# Patient Record
Sex: FEMALE | Race: WHITE | HISPANIC OR LATINO | Employment: FULL TIME | URBAN - NONMETROPOLITAN AREA
[De-identification: names, ages, dates, MRNs, and addresses within clinical notes are randomized per-mention and may not be internally consistent; named-entity substitution may affect disease eponyms.]

---

## 2022-10-22 ENCOUNTER — OFFICE VISIT (OUTPATIENT)
Dept: URGENT CARE | Facility: PHYSICIAN GROUP | Age: 22
End: 2022-10-22
Payer: OTHER GOVERNMENT

## 2022-10-22 VITALS
OXYGEN SATURATION: 97 % | DIASTOLIC BLOOD PRESSURE: 62 MMHG | HEART RATE: 67 BPM | HEIGHT: 65 IN | BODY MASS INDEX: 30.16 KG/M2 | TEMPERATURE: 97.2 F | WEIGHT: 181 LBS | SYSTOLIC BLOOD PRESSURE: 96 MMHG | RESPIRATION RATE: 14 BRPM

## 2022-10-22 DIAGNOSIS — H81.12 BENIGN PAROXYSMAL POSITIONAL VERTIGO OF LEFT EAR: ICD-10-CM

## 2022-10-22 PROCEDURE — 99203 OFFICE O/P NEW LOW 30 MIN: CPT | Performed by: FAMILY MEDICINE

## 2022-10-22 RX ORDER — MECLIZINE HYDROCHLORIDE 25 MG/1
TABLET ORAL
Qty: 20 TABLET | Refills: 0 | Status: ON HOLD | OUTPATIENT
Start: 2022-10-22 | End: 2023-11-13

## 2022-10-22 NOTE — PROGRESS NOTES
Chief Complaint:    Chief Complaint   Patient presents with    Dizziness     X3 days denies nausea, vomiting or diarrhea,. Pt is also having loss of balance and L ear pain comes and goes.        History of Present Illness:    Dizziness, description is consistent with vertigo, worse with change in head position for 3 days. No similar prior symptoms. In exam room, mild headache in front of head, otherwise feels normal. No fever, change in vision, nasal symptoms, sore throat, cough, vomiting, or diarrhea.      Past Medical History:    No past medical history on file.    Past Surgical History:    No past surgical history on file.    Social History:    Social History     Socioeconomic History    Marital status: Single     Spouse name: Not on file    Number of children: Not on file    Years of education: Not on file    Highest education level: Not on file   Occupational History    Not on file   Tobacco Use    Smoking status: Never    Smokeless tobacco: Never   Vaping Use    Vaping Use: Never used   Substance and Sexual Activity    Alcohol use: Never    Drug use: Never    Sexual activity: Not on file   Other Topics Concern    Not on file   Social History Narrative    Not on file     Social Determinants of Health     Financial Resource Strain: Not on file   Food Insecurity: Not on file   Transportation Needs: Not on file   Physical Activity: Not on file   Stress: Not on file   Social Connections: Not on file   Intimate Partner Violence: Not on file   Housing Stability: Not on file     Family History:    No family history on file.    Medications:    No current outpatient medications on file prior to visit.     No current facility-administered medications on file prior to visit.     Allergies:    No Known Allergies      Vitals:    Vitals:    10/22/22 1052   BP: (!) 96/62   BP Location: Left arm   Patient Position: Sitting   BP Cuff Size: Adult   Pulse: 67   Resp: 14   Temp: 36.2 °C (97.2 °F)   TempSrc: Temporal   SpO2: 97%  "  Weight: 82.1 kg (181 lb)   Height: 1.638 m (5' 4.5\")       Physical Exam:    Constitutional: Vital signs reviewed. Appears well-developed and well-nourished. No acute distress.   Eyes: Sclera white, conjunctivae clear. PERRLA.  ENT: External ears normal. External auditory canals normal without discharge. TMs translucent and non-bulging. Hearing normal. Lips/teeth are normal. Oral mucosa pink and moist. Posterior pharynx: WNL.  Neck: Neck supple.   Pulmonary/Chest: Respirations non-labored.   Musculoskeletal: Normal gait. Normal range of motion. No muscular atrophy or weakness.  Neurological: Alert and oriented to person, place, and time. CN 2-12 intact. Muscle tone normal. Coordination normal. Normal cerebellar exam. Positive Butte-Hallpike maneuver to left, negative to right.  Skin: No rashes or lesions. Warm, dry, normal turgor.  Psychiatric: Normal mood and affect. Behavior is normal. Judgment and thought content normal.       Medical Decision Makin. Benign paroxysmal positional vertigo of left ear  - meclizine (ANTIVERT) 25 MG Tab; 0.5 TO 1 TAB BY MOUTH EVERY 6 HOURS ONLY IF NEEDED FOR DIZZINESS, NAUSEA, OR VOMITING. MAY CAUSE DROWSINESS.  Dispense: 20 Tablet; Refill: 0       Discussed with her DDX, management options, and risks, benefits, and alternatives to treatment plan agreed upon.    Dizziness, description is consistent with vertigo, worse with change in head position for 3 days. No similar prior symptoms. In exam room, mild headache in front of head, otherwise feels normal. No fever, change in vision, nasal symptoms, sore throat, cough, vomiting, or diarrhea.    Positive Butte-Hallpike maneuver to left, negative to right.    Benign Positional Vertigo discussed.    Agreeable to medication prescribed for symptomatic treatment.    Encouraged watching YouTube video of ENT Shandra Cook MD Half Andrae Chi explaining this Canalith Repositioning Maneuver which she may do which may help symptoms " resolve faster than time alone.     Discussed expected course of duration, time for improvement, and to seek follow-up in Emergency Room, urgent care, or with PCP if getting worse at any time or not improving within expected time frame.

## 2023-06-08 ENCOUNTER — APPOINTMENT (RX ONLY)
Dept: URBAN - METROPOLITAN AREA CLINIC 22 | Facility: CLINIC | Age: 23
Setting detail: DERMATOLOGY
End: 2023-06-08

## 2023-06-08 DIAGNOSIS — L70.0 ACNE VULGARIS: ICD-10-CM | Status: INADEQUATELY CONTROLLED

## 2023-06-08 DIAGNOSIS — Z71.89 OTHER SPECIFIED COUNSELING: ICD-10-CM

## 2023-06-08 DIAGNOSIS — L259 CONTACT DERMATITIS AND OTHER ECZEMA, UNSPECIFIED CAUSE: ICD-10-CM | Status: INADEQUATELY CONTROLLED

## 2023-06-08 DIAGNOSIS — L81.0 POSTINFLAMMATORY HYPERPIGMENTATION: ICD-10-CM

## 2023-06-08 PROBLEM — L30.8 OTHER SPECIFIED DERMATITIS: Status: ACTIVE | Noted: 2023-06-08

## 2023-06-08 PROCEDURE — ? COUNSELING

## 2023-06-08 PROCEDURE — ? PRESCRIPTION

## 2023-06-08 PROCEDURE — ? TREATMENT REGIMEN

## 2023-06-08 PROCEDURE — ? SUNSCREEN RECOMMENDATIONS

## 2023-06-08 PROCEDURE — 99204 OFFICE O/P NEW MOD 45 MIN: CPT

## 2023-06-08 RX ORDER — CLINDAMYCIN PHOSPHATE 10 MG/ML
SOLUTION TOPICAL QD
Qty: 60 | Refills: 5 | Status: ERX | COMMUNITY
Start: 2023-06-08

## 2023-06-08 RX ORDER — ADAPALENE AND BENZOYL PEROXIDE 1; 25 MG/G; MG/G
GEL TOPICAL QHS
Qty: 45 | Refills: 5 | Status: ERX | COMMUNITY
Start: 2023-06-08

## 2023-06-08 RX ORDER — TRIAMCINOLONE ACETONIDE 1 MG/G
CREAM TOPICAL BID
Qty: 30 | Refills: 1 | Status: ERX | COMMUNITY
Start: 2023-06-08

## 2023-06-08 RX ADMIN — TRIAMCINOLONE ACETONIDE: 1 CREAM TOPICAL at 00:00

## 2023-06-08 RX ADMIN — CLINDAMYCIN PHOSPHATE: 10 SOLUTION TOPICAL at 00:00

## 2023-06-08 RX ADMIN — ADAPALENE AND BENZOYL PEROXIDE: 1; 25 GEL TOPICAL at 00:00

## 2023-06-08 ASSESSMENT — LOCATION SIMPLE DESCRIPTION DERM
LOCATION SIMPLE: INFERIOR FOREHEAD
LOCATION SIMPLE: CHEST
LOCATION SIMPLE: RIGHT CHEEK
LOCATION SIMPLE: LEFT POSTERIOR UPPER ARM
LOCATION SIMPLE: LEFT SHOULDER
LOCATION SIMPLE: RIGHT POSTERIOR UPPER ARM
LOCATION SIMPLE: LEFT CHEEK

## 2023-06-08 ASSESSMENT — LOCATION ZONE DERM
LOCATION ZONE: TRUNK
LOCATION ZONE: FACE
LOCATION ZONE: ARM

## 2023-06-08 ASSESSMENT — LOCATION DETAILED DESCRIPTION DERM
LOCATION DETAILED: LEFT ANTERIOR SHOULDER
LOCATION DETAILED: INFERIOR MID FOREHEAD
LOCATION DETAILED: RIGHT INFERIOR CENTRAL MALAR CHEEK
LOCATION DETAILED: RIGHT DISTAL POSTERIOR UPPER ARM
LOCATION DETAILED: RIGHT LATERAL SUPERIOR CHEST
LOCATION DETAILED: LEFT DISTAL MEDIAL POSTERIOR UPPER ARM
LOCATION DETAILED: LEFT INFERIOR CENTRAL MALAR CHEEK

## 2023-06-08 NOTE — PROCEDURE: COUNSELING
Detail Level: Generalized
Detail Level: Zone
Topical Clindamycin Counseling: Patient counseled that this medication may cause skin irritation or allergic reactions.  In the event of skin irritation, the patient was advised to reduce the amount of the drug applied or use it less frequently.   The patient verbalized understanding of the proper use and possible adverse effects of clindamycin.  All of the patient's questions and concerns were addressed.
Erythromycin Pregnancy And Lactation Text: This medication is Pregnancy Category B and is considered safe during pregnancy. It is also excreted in breast milk.
Birth Control Pills Counseling: Birth Control Pill Counseling: I discussed with the patient the potential side effects of OCPs including but not limited to increased risk of stroke, heart attack, thrombophlebitis, deep venous thrombosis, hepatic adenomas, breast changes, GI upset, headaches, and depression.  The patient verbalized understanding of the proper use and possible adverse effects of OCPs. All of the patient's questions and concerns were addressed.
Azelaic Acid Pregnancy And Lactation Text: This medication is considered safe during pregnancy and breast feeding.
Spironolactone Counseling: Patient advised regarding risks of diarrhea, abdominal pain, hyperkalemia, birth defects (for female patients), liver toxicity and renal toxicity. The patient may need blood work to monitor liver and kidney function and potassium levels while on therapy. The patient verbalized understanding of the proper use and possible adverse effects of spironolactone.  All of the patient's questions and concerns were addressed.
Sarecycline Counseling: Patient advised regarding possible photosensitivity and discoloration of the teeth, skin, lips, tongue and gums.  Patient instructed to avoid sunlight, if possible.  When exposed to sunlight, patients should wear protective clothing, sunglasses, and sunscreen.  The patient was instructed to call the office immediately if the following severe adverse effects occur:  hearing changes, easy bruising/bleeding, severe headache, or vision changes.  The patient verbalized understanding of the proper use and possible adverse effects of sarecycline.  All of the patient's questions and concerns were addressed.
Isotretinoin Pregnancy And Lactation Text: This medication is Pregnancy Category X and is considered extremely dangerous during pregnancy. It is unknown if it is excreted in breast milk.
Aklief Pregnancy And Lactation Text: It is unknown if this medication is safe to use during pregnancy.  It is unknown if this medication is excreted in breast milk.  Breastfeeding women should use the topical cream on the smallest area of the skin for the shortest time needed while breastfeeding.  Do not apply to nipple and areola.
Dapsone Counseling: I discussed with the patient the risks of dapsone including but not limited to hemolytic anemia, agranulocytosis, rashes, methemoglobinemia, kidney failure, peripheral neuropathy, headaches, GI upset, and liver toxicity.  Patients who start dapsone require monitoring including baseline LFTs and weekly CBCs for the first month, then every month thereafter.  The patient verbalized understanding of the proper use and possible adverse effects of dapsone.  All of the patient's questions and concerns were addressed.
Tetracycline Pregnancy And Lactation Text: This medication is Pregnancy Category D and not consider safe during pregnancy. It is also excreted in breast milk.
Topical Retinoid counseling:  Patient advised to apply a pea-sized amount only at bedtime and wait 30 minutes after washing their face before applying.  If too drying, patient may add a non-comedogenic moisturizer. The patient verbalized understanding of the proper use and possible adverse effects of retinoids.  All of the patient's questions and concerns were addressed.
Topical Sulfur Applications Pregnancy And Lactation Text: This medication is Pregnancy Category C and has an unknown safety profile during pregnancy. It is unknown if this topical medication is excreted in breast milk.
Azithromycin Counseling:  I discussed with the patient the risks of azithromycin including but not limited to GI upset, allergic reaction, drug rash, diarrhea, and yeast infections.
Doxycycline Pregnancy And Lactation Text: This medication is Pregnancy Category D and not consider safe during pregnancy. It is also excreted in breast milk but is considered safe for shorter treatment courses.
Bactrim Counseling:  I discussed with the patient the risks of sulfa antibiotics including but not limited to GI upset, allergic reaction, drug rash, diarrhea, dizziness, photosensitivity, and yeast infections.  Rarely, more serious reactions can occur including but not limited to aplastic anemia, agranulocytosis, methemoglobinemia, blood dyscrasias, liver or kidney failure, lung infiltrates or desquamative/blistering drug rashes.
Minocycline Counseling: Patient advised regarding possible photosensitivity and discoloration of the teeth, skin, lips, tongue and gums.  Patient instructed to avoid sunlight, if possible.  When exposed to sunlight, patients should wear protective clothing, sunglasses, and sunscreen.  The patient was instructed to call the office immediately if the following severe adverse effects occur:  hearing changes, easy bruising/bleeding, severe headache, or vision changes.  The patient verbalized understanding of the proper use and possible adverse effects of minocycline.  All of the patient's questions and concerns were addressed.
Winlevi Pregnancy And Lactation Text: This medication is considered safe during pregnancy and breastfeeding.
Tazorac Counseling:  Patient advised that medication is irritating and drying.  Patient may need to apply sparingly and wash off after an hour before eventually leaving it on overnight.  The patient verbalized understanding of the proper use and possible adverse effects of tazorac.  All of the patient's questions and concerns were addressed.
Use Enhanced Medication Counseling?: No
Birth Control Pills Pregnancy And Lactation Text: This medication should be avoided if pregnant and for the first 30 days post-partum.
Azelaic Acid Counseling: Patient counseled that medicine may cause skin irritation and to avoid applying near the eyes.  In the event of skin irritation, the patient was advised to reduce the amount of the drug applied or use it less frequently.   The patient verbalized understanding of the proper use and possible adverse effects of azelaic acid.  All of the patient's questions and concerns were addressed.
Tazorac Pregnancy And Lactation Text: This medication is not safe during pregnancy. It is unknown if this medication is excreted in breast milk.
Isotretinoin Counseling: Patient should get monthly blood tests, not donate blood, not drive at night if vision affected, not share medication, and not undergo elective surgery for 6 months after tx completed. Side effects reviewed, pt to contact office should one occur.
Dapsone Pregnancy And Lactation Text: This medication is Pregnancy Category C and is not considered safe during pregnancy or breast feeding.
Benzoyl Peroxide Counseling: Patient counseled that medicine may cause skin irritation and bleach clothing.  In the event of skin irritation, the patient was advised to reduce the amount of the drug applied or use it less frequently.   The patient verbalized understanding of the proper use and possible adverse effects of benzoyl peroxide.  All of the patient's questions and concerns were addressed.
High Dose Vitamin A Counseling: Side effects reviewed, pt to contact office should one occur.
Spironolactone Pregnancy And Lactation Text: This medication can cause feminization of the male fetus and should be avoided during pregnancy. The active metabolite is also found in breast milk.
Topical Clindamycin Pregnancy And Lactation Text: This medication is Pregnancy Category B and is considered safe during pregnancy. It is unknown if it is excreted in breast milk.
Azithromycin Pregnancy And Lactation Text: This medication is considered safe during pregnancy and is also secreted in breast milk.
Doxycycline Counseling:  Patient counseled regarding possible photosensitivity and increased risk for sunburn.  Patient instructed to avoid sunlight, if possible.  When exposed to sunlight, patients should wear protective clothing, sunglasses, and sunscreen.  The patient was instructed to call the office immediately if the following severe adverse effects occur:  hearing changes, easy bruising/bleeding, severe headache, or vision changes.  The patient verbalized understanding of the proper use and possible adverse effects of doxycycline.  All of the patient's questions and concerns were addressed.
High Dose Vitamin A Pregnancy And Lactation Text: High dose vitamin A therapy is contraindicated during pregnancy and breast feeding.
Tetracycline Counseling: Patient counseled regarding possible photosensitivity and increased risk for sunburn.  Patient instructed to avoid sunlight, if possible.  When exposed to sunlight, patients should wear protective clothing, sunglasses, and sunscreen.  The patient was instructed to call the office immediately if the following severe adverse effects occur:  hearing changes, easy bruising/bleeding, severe headache, or vision changes.  The patient verbalized understanding of the proper use and possible adverse effects of tetracycline.  All of the patient's questions and concerns were addressed. Patient understands to avoid pregnancy while on therapy due to potential birth defects.
Benzoyl Peroxide Pregnancy And Lactation Text: This medication is Pregnancy Category C. It is unknown if benzoyl peroxide is excreted in breast milk.
Topical Sulfur Applications Counseling: Topical Sulfur Counseling: Patient counseled that this medication may cause skin irritation or allergic reactions.  In the event of skin irritation, the patient was advised to reduce the amount of the drug applied or use it less frequently.   The patient verbalized understanding of the proper use and possible adverse effects of topical sulfur application.  All of the patient's questions and concerns were addressed.
Winlevi Counseling:  I discussed with the patient the risks of topical clascoterone including but not limited to erythema, scaling, itching, and stinging. Patient voiced their understanding.
Bactrim Pregnancy And Lactation Text: This medication is Pregnancy Category D and is known to cause fetal risk.  It is also excreted in breast milk.
Aklief counseling:  Patient advised to apply a pea-sized amount only at bedtime and wait 30 minutes after washing their face before applying.  If too drying, patient may add a non-comedogenic moisturizer.  The most commonly reported side effects including irritation, redness, scaling, dryness, stinging, burning, itching, and increased risk of sunburn.  The patient verbalized understanding of the proper use and possible adverse effects of retinoids.  All of the patient's questions and concerns were addressed.
Topical Retinoid Pregnancy And Lactation Text: This medication is Pregnancy Category C. It is unknown if this medication is excreted in breast milk.
Erythromycin Counseling:  I discussed with the patient the risks of erythromycin including but not limited to GI upset, allergic reaction, drug rash, diarrhea, increase in liver enzymes, and yeast infections.

## 2023-06-08 NOTE — PROCEDURE: TREATMENT REGIMEN
Show Cerave Line: Yes
Action 2: Continue
Detail Level: Generalized
Aaron La Roche-Posay Products: No
Detail Level: Zone

## 2023-06-13 ENCOUNTER — RX ONLY (OUTPATIENT)
Age: 23
Setting detail: RX ONLY
End: 2023-06-13

## 2023-06-13 RX ORDER — ADAPALENE 1 MG/G
GEL TOPICAL
Qty: 45 | Refills: 5 | Status: ERX | COMMUNITY
Start: 2023-06-13

## 2023-11-12 ENCOUNTER — HOSPITAL ENCOUNTER (INPATIENT)
Facility: MEDICAL CENTER | Age: 23
LOS: 18 days | End: 2023-12-01
Attending: OBSTETRICS & GYNECOLOGY | Admitting: OBSTETRICS & GYNECOLOGY
Payer: OTHER GOVERNMENT

## 2023-11-12 DIAGNOSIS — O23.42 URINARY TRACT INFECTION AFFECTING CARE OF MOTHER IN SECOND TRIMESTER, ANTEPARTUM: ICD-10-CM

## 2023-11-12 PROCEDURE — 99284 EMERGENCY DEPT VISIT MOD MDM: CPT

## 2023-11-12 PROCEDURE — 700111 HCHG RX REV CODE 636 W/ 250 OVERRIDE (IP): Mod: JZ | Performed by: OBSTETRICS & GYNECOLOGY

## 2023-11-12 PROCEDURE — 700105 HCHG RX REV CODE 258: Performed by: OBSTETRICS & GYNECOLOGY

## 2023-11-12 RX ORDER — MAGNESIUM SULFATE HEPTAHYDRATE 40 MG/ML
INJECTION, SOLUTION INTRAVENOUS
Status: ACTIVE
Start: 2023-11-12 | End: 2023-11-13

## 2023-11-12 RX ADMIN — MAGNESIUM SULFATE HEPTAHYDRATE 2 G/HR: 40 INJECTION, SOLUTION INTRAVENOUS at 23:45

## 2023-11-12 RX ADMIN — SODIUM CHLORIDE, POTASSIUM CHLORIDE, SODIUM LACTATE AND CALCIUM CHLORIDE: 600; 310; 30; 20 INJECTION, SOLUTION INTRAVENOUS at 23:45

## 2023-11-13 ENCOUNTER — APPOINTMENT (OUTPATIENT)
Dept: RADIOLOGY | Facility: MEDICAL CENTER | Age: 23
End: 2023-11-13
Attending: OBSTETRICS & GYNECOLOGY
Payer: OTHER GOVERNMENT

## 2023-11-13 LAB
ABO + RH BLD: NORMAL
ABO GROUP BLD: NORMAL
APPEARANCE UR: CLEAR
BACTERIA #/AREA URNS HPF: NEGATIVE /HPF
BASOPHILS # BLD AUTO: 0.3 % (ref 0–1.8)
BASOPHILS # BLD: 0.04 K/UL (ref 0–0.12)
BILIRUB UR QL STRIP.AUTO: NEGATIVE
BLD GP AB SCN SERPL QL: NORMAL
C TRACH DNA SPEC QL NAA+PROBE: NEGATIVE
CANDIDA DNA VAG QL PROBE+SIG AMP: NEGATIVE
COLOR UR: YELLOW
EOSINOPHIL # BLD AUTO: 0.02 K/UL (ref 0–0.51)
EOSINOPHIL NFR BLD: 0.1 % (ref 0–6.9)
EPI CELLS #/AREA URNS HPF: ABNORMAL /HPF
ERYTHROCYTE [DISTWIDTH] IN BLOOD BY AUTOMATED COUNT: 42 FL (ref 35.9–50)
G VAGINALIS DNA VAG QL PROBE+SIG AMP: POSITIVE
GLUCOSE UR STRIP.AUTO-MCNC: NEGATIVE MG/DL
HBV SURFACE AG SER QL: ABNORMAL
HCT VFR BLD AUTO: 36.8 % (ref 37–47)
HCV AB SER QL: ABNORMAL
HGB BLD-MCNC: 12.5 G/DL (ref 12–16)
HIV 1+2 AB+HIV1 P24 AG SERPL QL IA: NORMAL
HYALINE CASTS #/AREA URNS LPF: ABNORMAL /LPF
IMM GRANULOCYTES # BLD AUTO: 0.19 K/UL (ref 0–0.11)
IMM GRANULOCYTES NFR BLD AUTO: 1.2 % (ref 0–0.9)
KETONES UR STRIP.AUTO-MCNC: ABNORMAL MG/DL
LEUKOCYTE ESTERASE UR QL STRIP.AUTO: ABNORMAL
LYMPHOCYTES # BLD AUTO: 1.7 K/UL (ref 1–4.8)
LYMPHOCYTES NFR BLD: 10.8 % (ref 22–41)
MAGNESIUM SERPL-MCNC: 4.5 MG/DL (ref 1.5–2.5)
MAGNESIUM SERPL-MCNC: 5.3 MG/DL (ref 1.5–2.5)
MAGNESIUM SERPL-MCNC: 5.3 MG/DL (ref 1.5–2.5)
MCH RBC QN AUTO: 30.7 PG (ref 27–33)
MCHC RBC AUTO-ENTMCNC: 34 G/DL (ref 32.2–35.5)
MCV RBC AUTO: 90.4 FL (ref 81.4–97.8)
MICRO URNS: ABNORMAL
MONOCYTES # BLD AUTO: 0.24 K/UL (ref 0–0.85)
MONOCYTES NFR BLD AUTO: 1.5 % (ref 0–13.4)
N GONORRHOEA DNA SPEC QL NAA+PROBE: NEGATIVE
NEUTROPHILS # BLD AUTO: 13.53 K/UL (ref 1.82–7.42)
NEUTROPHILS NFR BLD: 86.1 % (ref 44–72)
NITRITE UR QL STRIP.AUTO: NEGATIVE
NRBC # BLD AUTO: 0.02 K/UL
NRBC BLD-RTO: 0.1 /100 WBC (ref 0–0.2)
PH UR STRIP.AUTO: 6 [PH] (ref 5–8)
PLATELET # BLD AUTO: 306 K/UL (ref 164–446)
PMV BLD AUTO: 10 FL (ref 9–12.9)
PROT UR QL STRIP: NEGATIVE MG/DL
RBC # BLD AUTO: 4.07 M/UL (ref 4.2–5.4)
RBC # URNS HPF: >150 /HPF
RBC UR QL AUTO: ABNORMAL
RH BLD: NORMAL
RUBV AB SER QL: 188 IU/ML
SP GR UR STRIP.AUTO: 1.02
SPECIMEN SOURCE: NORMAL
T PALLIDUM AB SER QL IA: ABNORMAL
T VAGINALIS DNA VAG QL PROBE+SIG AMP: NEGATIVE
UROBILINOGEN UR STRIP.AUTO-MCNC: 0.2 MG/DL
WBC # BLD AUTO: 15.7 K/UL (ref 4.8–10.8)
WBC #/AREA URNS HPF: ABNORMAL /HPF

## 2023-11-13 PROCEDURE — 86762 RUBELLA ANTIBODY: CPT

## 2023-11-13 PROCEDURE — 36415 COLL VENOUS BLD VENIPUNCTURE: CPT

## 2023-11-13 PROCEDURE — 87150 DNA/RNA AMPLIFIED PROBE: CPT

## 2023-11-13 PROCEDURE — 86592 SYPHILIS TEST NON-TREP QUAL: CPT

## 2023-11-13 PROCEDURE — 80053 COMPREHEN METABOLIC PANEL: CPT

## 2023-11-13 PROCEDURE — 87660 TRICHOMONAS VAGIN DIR PROBE: CPT

## 2023-11-13 PROCEDURE — 87510 GARDNER VAG DNA DIR PROBE: CPT

## 2023-11-13 PROCEDURE — 86780 TREPONEMA PALLIDUM: CPT

## 2023-11-13 PROCEDURE — 87491 CHLMYD TRACH DNA AMP PROBE: CPT

## 2023-11-13 PROCEDURE — 86900 BLOOD TYPING SEROLOGIC ABO: CPT

## 2023-11-13 PROCEDURE — 87086 URINE CULTURE/COLONY COUNT: CPT

## 2023-11-13 PROCEDURE — 86901 BLOOD TYPING SEROLOGIC RH(D): CPT

## 2023-11-13 PROCEDURE — 59025 FETAL NON-STRESS TEST: CPT | Mod: 26 | Performed by: OBSTETRICS & GYNECOLOGY

## 2023-11-13 PROCEDURE — 76817 TRANSVAGINAL US OBSTETRIC: CPT

## 2023-11-13 PROCEDURE — 700102 HCHG RX REV CODE 250 W/ 637 OVERRIDE(OP): Performed by: OBSTETRICS & GYNECOLOGY

## 2023-11-13 PROCEDURE — 99222 1ST HOSP IP/OBS MODERATE 55: CPT | Mod: 25 | Performed by: OBSTETRICS & GYNECOLOGY

## 2023-11-13 PROCEDURE — 87081 CULTURE SCREEN ONLY: CPT

## 2023-11-13 PROCEDURE — 83735 ASSAY OF MAGNESIUM: CPT | Mod: 91

## 2023-11-13 PROCEDURE — A9270 NON-COVERED ITEM OR SERVICE: HCPCS | Performed by: OBSTETRICS & GYNECOLOGY

## 2023-11-13 PROCEDURE — 700105 HCHG RX REV CODE 258: Performed by: OBSTETRICS & GYNECOLOGY

## 2023-11-13 PROCEDURE — 87389 HIV-1 AG W/HIV-1&-2 AB AG IA: CPT

## 2023-11-13 PROCEDURE — 85025 COMPLETE CBC W/AUTO DIFF WBC: CPT

## 2023-11-13 PROCEDURE — 87340 HEPATITIS B SURFACE AG IA: CPT

## 2023-11-13 PROCEDURE — 87480 CANDIDA DNA DIR PROBE: CPT

## 2023-11-13 PROCEDURE — 86850 RBC ANTIBODY SCREEN: CPT

## 2023-11-13 PROCEDURE — 81001 URINALYSIS AUTO W/SCOPE: CPT

## 2023-11-13 PROCEDURE — 700111 HCHG RX REV CODE 636 W/ 250 OVERRIDE (IP): Performed by: STUDENT IN AN ORGANIZED HEALTH CARE EDUCATION/TRAINING PROGRAM

## 2023-11-13 PROCEDURE — 700111 HCHG RX REV CODE 636 W/ 250 OVERRIDE (IP): Performed by: OBSTETRICS & GYNECOLOGY

## 2023-11-13 PROCEDURE — 302790 HCHG STAT ANTEPARTUM CARE, DAILY

## 2023-11-13 PROCEDURE — 87591 N.GONORRHOEAE DNA AMP PROB: CPT

## 2023-11-13 PROCEDURE — 86803 HEPATITIS C AB TEST: CPT

## 2023-11-13 PROCEDURE — 770002 HCHG ROOM/CARE - OB PRIVATE (112)

## 2023-11-13 RX ORDER — SODIUM CHLORIDE, SODIUM LACTATE, POTASSIUM CHLORIDE, CALCIUM CHLORIDE 600; 310; 30; 20 MG/100ML; MG/100ML; MG/100ML; MG/100ML
INJECTION, SOLUTION INTRAVENOUS CONTINUOUS
Status: DISCONTINUED | OUTPATIENT
Start: 2023-11-13 | End: 2023-11-14

## 2023-11-13 RX ORDER — ASPIRIN 81 MG/1
81 TABLET, CHEWABLE ORAL DAILY
Status: ON HOLD | COMMUNITY
End: 2023-12-01

## 2023-11-13 RX ORDER — CALCIUM GLUCONATE 94 MG/ML
1 INJECTION, SOLUTION INTRAVENOUS
Status: DISCONTINUED | OUTPATIENT
Start: 2023-11-13 | End: 2023-11-14

## 2023-11-13 RX ORDER — BETAMETHASONE SODIUM PHOSPHATE AND BETAMETHASONE ACETATE 3; 3 MG/ML; MG/ML
12 INJECTION, SUSPENSION INTRA-ARTICULAR; INTRALESIONAL; INTRAMUSCULAR; SOFT TISSUE ONCE
Status: DISCONTINUED | OUTPATIENT
Start: 2023-11-13 | End: 2023-11-13

## 2023-11-13 RX ORDER — ACETAMINOPHEN 325 MG/1
650 TABLET ORAL EVERY 6 HOURS PRN
Status: DISCONTINUED | OUTPATIENT
Start: 2023-11-13 | End: 2023-12-01 | Stop reason: HOSPADM

## 2023-11-13 RX ORDER — MAGNESIUM SULFATE HEPTAHYDRATE 40 MG/ML
2 INJECTION, SOLUTION INTRAVENOUS CONTINUOUS
Status: DISCONTINUED | OUTPATIENT
Start: 2023-11-13 | End: 2023-11-13 | Stop reason: CLARIF

## 2023-11-13 RX ORDER — MAGNESIUM SULFATE HEPTAHYDRATE 40 MG/ML
2 INJECTION, SOLUTION INTRAVENOUS CONTINUOUS
Status: DISCONTINUED | OUTPATIENT
Start: 2023-11-13 | End: 2023-11-14

## 2023-11-13 RX ORDER — BETAMETHASONE SODIUM PHOSPHATE AND BETAMETHASONE ACETATE 3; 3 MG/ML; MG/ML
12 INJECTION, SUSPENSION INTRA-ARTICULAR; INTRALESIONAL; INTRAMUSCULAR; SOFT TISSUE EVERY 24 HOURS
Status: DISCONTINUED | OUTPATIENT
Start: 2023-11-13 | End: 2023-11-14

## 2023-11-13 RX ORDER — METRONIDAZOLE 500 MG/1
500 TABLET ORAL EVERY 12 HOURS
Status: COMPLETED | OUTPATIENT
Start: 2023-11-13 | End: 2023-11-19

## 2023-11-13 RX ADMIN — SODIUM CHLORIDE, POTASSIUM CHLORIDE, SODIUM LACTATE AND CALCIUM CHLORIDE: 600; 310; 30; 20 INJECTION, SOLUTION INTRAVENOUS at 23:50

## 2023-11-13 RX ADMIN — AMPICILLIN SODIUM 2000 MG: 2 INJECTION, POWDER, FOR SOLUTION INTRAVENOUS at 12:01

## 2023-11-13 RX ADMIN — METRONIDAZOLE 500 MG: 500 TABLET ORAL at 04:07

## 2023-11-13 RX ADMIN — BETAMETHASONE ACETATE AND BETAMETHASONE SODIUM PHOSPHATE 12 MG: 3; 3 INJECTION, SUSPENSION INTRA-ARTICULAR; INTRALESIONAL; INTRAMUSCULAR; SOFT TISSUE at 21:51

## 2023-11-13 RX ADMIN — AMPICILLIN SODIUM 2000 MG: 2 INJECTION, POWDER, FOR SOLUTION INTRAVENOUS at 04:56

## 2023-11-13 RX ADMIN — MAGNESIUM SULFATE HEPTAHYDRATE 2 G/HR: 40 INJECTION, SOLUTION INTRAVENOUS at 18:27

## 2023-11-13 RX ADMIN — SODIUM CHLORIDE, POTASSIUM CHLORIDE, SODIUM LACTATE AND CALCIUM CHLORIDE: 600; 310; 30; 20 INJECTION, SOLUTION INTRAVENOUS at 10:17

## 2023-11-13 RX ADMIN — AMPICILLIN SODIUM 2000 MG: 2 INJECTION, POWDER, FOR SOLUTION INTRAVENOUS at 17:28

## 2023-11-13 RX ADMIN — METRONIDAZOLE 500 MG: 500 TABLET ORAL at 17:27

## 2023-11-13 ASSESSMENT — LIFESTYLE VARIABLES
CONSUMPTION TOTAL: INCOMPLETE
EVER HAD A DRINK FIRST THING IN THE MORNING TO STEADY YOUR NERVES TO GET RID OF A HANGOVER: NO
EVER FELT BAD OR GUILTY ABOUT YOUR DRINKING: NO
TOTAL SCORE: 0
HAVE YOU EVER FELT YOU SHOULD CUT DOWN ON YOUR DRINKING: NO
ALCOHOL_USE: NO
TOTAL SCORE: 0
HAVE PEOPLE ANNOYED YOU BY CRITICIZING YOUR DRINKING: NO
TOTAL SCORE: 0

## 2023-11-13 ASSESSMENT — PATIENT HEALTH QUESTIONNAIRE - PHQ9
SUM OF ALL RESPONSES TO PHQ9 QUESTIONS 1 AND 2: 0
1. LITTLE INTEREST OR PLEASURE IN DOING THINGS: NOT AT ALL
1. LITTLE INTEREST OR PLEASURE IN DOING THINGS: NOT AT ALL
2. FEELING DOWN, DEPRESSED, IRRITABLE, OR HOPELESS: NOT AT ALL
2. FEELING DOWN, DEPRESSED, IRRITABLE, OR HOPELESS: NOT AT ALL
SUM OF ALL RESPONSES TO PHQ9 QUESTIONS 1 AND 2: 0

## 2023-11-13 NOTE — PROGRESS NOTES
ANTEPARTUM PROGRESS NOTE;    Beka Du is a 23 y.o. female  at 24w5d.    Patient Active Problem List    Diagnosis Date Noted    Indication for care in labor or delivery 2023         SUBJECTIVE:  Patient seen and examined here today. Overall, she is doing well. States she did have a few episodes of some cramping that took her breath away, but that has since resolved. She has not noticed any additional vaginal bleeding or leaking of fluid. . Denies HA, change in vision, RUQ/epigastric pain, SOB, CP, fever, nausea/vomiting, edema or calf pain. US performed this AM. LUTHER: 12.71 cm; fetal lie: transverse; FHR: 136 bpm; EFW: 787.60 g. It is noted that there is marked funneling with fluid extending into significantly widened cervix. Last cervical check she was dilated to 2 cm.     She is currently receiving magnesium. Last received betamethasone at 2145.    Contractions: a few episodes but this has since resolved  Leaking of fluid: denies  Vaginal bleeding: none since initial episode  Fetal movement: present    Review of systems; denies vaginal bleeding, leakage of fluid, uterine contractions, fever chills or abdominal pain  History reviewed. No pertinent past medical history.  No past surgical history on file.  Patient has no known allergies.  Social History     Socioeconomic History    Marital status: Single     Spouse name: Not on file    Number of children: Not on file    Years of education: Not on file    Highest education level: Not on file   Occupational History    Not on file   Tobacco Use    Smoking status: Never    Smokeless tobacco: Never   Vaping Use    Vaping Use: Never used   Substance and Sexual Activity    Alcohol use: Never    Drug use: Never    Sexual activity: Not on file   Other Topics Concern    Not on file   Social History Narrative    Not on file     Social Determinants of Health     Financial Resource Strain: Not on file   Food Insecurity: Not on file  "  Transportation Needs: Not on file   Physical Activity: Not on file   Stress: Not on file   Social Connections: Not on file   Intimate Partner Violence: Not on file   Housing Stability: Not on file         Physical examination;  /57   Pulse 96   Temp 36.6 °C (97.9 °F) (Temporal)   Resp 15   Ht 1.651 m (5' 5\")   Wt 88.9 kg (196 lb)   SpO2 96%   BMI 32.62 kg/m²   Alert and oriented x3  Gen.-well-developed well-nourished female in no apparent distress  HEENT-normocephalic, nontraumatic,EOMI,PERRLA  Skin is warm and dry  Back-negative for CVA tenderness  Cardiovascular-regular rate and rhythm, normal S1-S2 no murmurs gallops  Lungs-clear to auscultation bilaterally, no labored respitations  Abdomen-nondistended positive bowel sounds soft nontender without masses or hepatosplenomegaly. Surgical scar present to lower abdomen c/w hx of ovarian cyst removal surgery.   Cervix- declined  Extremities without cyanosis clubbing or edema, no evidence of DVT  Neurologic grossly intact    Lab Results   Component Value Date/Time    WBC 15.7 (H) 2023 01:52 AM             NST-as performed and read by myself; reactive NST without contractions    Assessment  IUP AT 24w5d   Ms. Lillian Du is a 23 y.o. year old female at 24w5d transferred from [facility] for  labor with vaginal bleeding. She was given a 6 g magnesium sulfate bolus, followed by 3 g/h, 1 dose of betamethasone, and started on ampicillin.  She also received a dose of azithromycin. Repeat US this AM showed LUTHER: 12.71; Cervical length: 3.61cm and transverse lie of fetus. There is marked funneling with fluid extending into significantly widened cervix. EFW: 787.60 grams       Plan;  1. Will received second dose of betamethasone at 2145 (24 hours after initial dose)  2. NST daily   3. Magnesium sulfate 6gm load, followed by 2g/hour  4. Bacterial Vaginosis  -Flagyl 500 mg BID   5. GBS in process  -Continue Ampicillin 2g IV every 6 " hours.      Binta Carrizales P.A.-C.  Obstetrics and Gynecology  11/13/2023     9:41 AM

## 2023-11-13 NOTE — H&P
"History and Physical      Beka Du is a 23 y.o. year old female  at 24w5d by 8+1/7 week US on 23, OMKAR 24 who presents as a transfer from [facility] for  labor.  She presented today with complaints of vaginal bleeding.  She denies contractions or gush of fluid.  The fetus is active.    Her pregnancy has been uncomplicated thus far.    She was reported to have a bulging bag visualized, with cervix 3 cm dilated.  She was given a 6 g magnesium sulfate bolus, followed by 3 g/h, 1 dose of betamethasone, and started on ampicillin.  She also received a dose of azithromycin.      ROS: Pertinent items are noted in HPI.    History reviewed. No pertinent past medical history.  No past surgical history on file.  OB History    Para Term  AB Living   1             SAB IAB Ectopic Molar Multiple Live Births                    # Outcome Date GA Lbr Sly/2nd Weight Sex Delivery Anes PTL Lv   1 Current              Allergies: Patient has no known allergies.    Current Facility-Administered Medications:     lactated ringers infusion, , Intravenous, Continuous, Snehal Palacios M.D.    calcium GLUConate injection 1 g, 1 g, Intravenous, Once PRN, Snehal Palacios M.D.    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  HEENT: PERRLA, EOMI, no scleral icterus  RESPIRATORY: Normal respiratory effort.  Lungs clear to auscultation.   CARDIOVASCULAR: RRR  ABDOMEN: Soft, gravid, nontender.  EXTREMITIES: No edema, calves N, Reflexes 1+  SKIN: No rash    Sterile speculum exam -watery bloody fluid noted, no active bleeding.  Cervix poorly visualized  SVE: 2cm/50%/-high, no presenting part palpated    NST INTERPRETATION " - PER MY READ    INDICATIONS: PTL  UTERINE ACTIVITY: rare contractions, + irritability  BASELINE: 140s  VARIABILITY: moderate  ACCELERATIONS: absent  DECELERATIONS: none  Appropriate for gestational age    Blood type Rh +  UDS negative     Assessment:   Beka Du at 24w5d transferred from [facility] for  labor.  Previously noted to be in transverse presentation.  Currently not bertha on magnesium sulfate at 3 g/h.     Plan:     Admit to L&D  Plan for second dose of betamethasone at 9:45 AM  Reduce magnesium sulfate to 2 g/h  Continue ampicillin 2 g IV every 6 hours    Prenatal panel ordered  Urine gonorrhea/ chlamydia  Vaginal pathogen swab obtained  Group B strep obtained, although patient has already received ampicillin  Ultrasound ordered for position, EFW, placental position, LUTHER, cervical length  Continuous monitoring

## 2023-11-13 NOTE — PROGRESS NOTES
2340: Pt arrived via care flight and admitted into [facility] [place]. Transferred to bed via gurney with slide board. Report received from transferring nurse in [place] to AGUSTO Phillips RN. Care flight report taken from team. Magnesium sulfate running at 3g/hr. VSS. Report received that pt arrived to L&D in [place] with vaginal bleeding and uterine contractions that started at approx 1940 on 11/12/23.   Reports occasional uterine contractions. Monson catheter in place from transfer. No clonus noted. DTR's 2+.  0015: FOB now at bedside. Dr Palacios to bedside to evaluate. Order received for magnesium sulfate infusion to be decreased to 2g/hr.   0025: Sterile spec exam completed by Dr. Palacios. GBS collected, SVE 2cm/50/high.   Urine collected per orders and sent to lab. VPD collected and sent to lab. GBS sent to lab.   0115: US at bedside.   0145: Lab at bedside for blood draw.   0335: Notified Dr Palacios of lab results, order received.   0615: Pt resting.   0705: Report given to Candelario ROJAS.

## 2023-11-13 NOTE — PROGRESS NOTES
0700. Report from Quin ROJAS. POC discussed and resumed. At the bedside. Pt has no needs at this time. She denies uc's, leaking or bleeding and notes +FM.    1900. Report to Ivon ROJAS. POC discussed.

## 2023-11-13 NOTE — CARE PLAN
The patient is Watcher - Medium risk of patient condition declining or worsening         Progress made toward(s) clinical / shift goals:  expresses understanding of POC and admission.

## 2023-11-14 LAB
ALBUMIN SERPL BCP-MCNC: 3.8 G/DL (ref 3.2–4.9)
ALBUMIN/GLOB SERPL: 1.2 G/DL
ALP SERPL-CCNC: 83 U/L (ref 30–99)
ALT SERPL-CCNC: 13 U/L (ref 2–50)
ANION GAP SERPL CALC-SCNC: 19 MMOL/L (ref 7–16)
AST SERPL-CCNC: 22 U/L (ref 12–45)
BILIRUB SERPL-MCNC: 0.2 MG/DL (ref 0.1–1.5)
BUN SERPL-MCNC: 5 MG/DL (ref 8–22)
CALCIUM ALBUM COR SERPL-MCNC: 8.3 MG/DL (ref 8.5–10.5)
CALCIUM SERPL-MCNC: 8.1 MG/DL (ref 8.5–10.5)
CHLORIDE SERPL-SCNC: 102 MMOL/L (ref 96–112)
CO2 SERPL-SCNC: 15 MMOL/L (ref 20–33)
CREAT SERPL-MCNC: 0.55 MG/DL (ref 0.5–1.4)
GFR SERPLBLD CREATININE-BSD FMLA CKD-EPI: 130 ML/MIN/1.73 M 2
GLOBULIN SER CALC-MCNC: 3.1 G/DL (ref 1.9–3.5)
GLUCOSE SERPL-MCNC: 113 MG/DL (ref 65–99)
GP B STREP DNA SPEC QL NAA+PROBE: POSITIVE
MAGNESIUM SERPL-MCNC: 4.6 MG/DL (ref 1.5–2.5)
MAGNESIUM SERPL-MCNC: 4.7 MG/DL (ref 1.5–2.5)
POTASSIUM SERPL-SCNC: 4.2 MMOL/L (ref 3.6–5.5)
PROT SERPL-MCNC: 6.9 G/DL (ref 6–8.2)
SODIUM SERPL-SCNC: 136 MMOL/L (ref 135–145)

## 2023-11-14 PROCEDURE — 700102 HCHG RX REV CODE 250 W/ 637 OVERRIDE(OP): Performed by: OBSTETRICS & GYNECOLOGY

## 2023-11-14 PROCEDURE — A9270 NON-COVERED ITEM OR SERVICE: HCPCS | Performed by: OBSTETRICS & GYNECOLOGY

## 2023-11-14 PROCEDURE — 700111 HCHG RX REV CODE 636 W/ 250 OVERRIDE (IP): Performed by: OBSTETRICS & GYNECOLOGY

## 2023-11-14 PROCEDURE — 36415 COLL VENOUS BLD VENIPUNCTURE: CPT

## 2023-11-14 PROCEDURE — 700105 HCHG RX REV CODE 258: Performed by: OBSTETRICS & GYNECOLOGY

## 2023-11-14 PROCEDURE — 770002 HCHG ROOM/CARE - OB PRIVATE (112)

## 2023-11-14 PROCEDURE — 83735 ASSAY OF MAGNESIUM: CPT | Mod: 91

## 2023-11-14 PROCEDURE — 302790 HCHG STAT ANTEPARTUM CARE, DAILY

## 2023-11-14 RX ORDER — SODIUM CHLORIDE, SODIUM LACTATE, POTASSIUM CHLORIDE, CALCIUM CHLORIDE 600; 310; 30; 20 MG/100ML; MG/100ML; MG/100ML; MG/100ML
INJECTION, SOLUTION INTRAVENOUS PRN
Status: DISCONTINUED | OUTPATIENT
Start: 2023-11-14 | End: 2023-11-19

## 2023-11-14 RX ORDER — VITAMIN A ACETATE, BETA CAROTENE, ASCORBIC ACID, CHOLECALCIFEROL, .ALPHA.-TOCOPHEROL ACETATE, DL-, THIAMINE MONONITRATE, RIBOFLAVIN, NIACINAMIDE, PYRIDOXINE HYDROCHLORIDE, FOLIC ACID, CYANOCOBALAMIN, CALCIUM CARBONATE, FERROUS FUMARATE, ZINC OXIDE, CUPRIC OXIDE 3080; 12; 120; 400; 1; 1.84; 3; 20; 22; 920; 25; 200; 27; 10; 2 [IU]/1; UG/1; MG/1; [IU]/1; MG/1; MG/1; MG/1; MG/1; MG/1; [IU]/1; MG/1; MG/1; MG/1; MG/1; MG/1
1 TABLET, FILM COATED ORAL
Status: DISCONTINUED | OUTPATIENT
Start: 2023-11-14 | End: 2023-12-01 | Stop reason: HOSPADM

## 2023-11-14 RX ORDER — DOCUSATE SODIUM 100 MG/1
100 CAPSULE, LIQUID FILLED ORAL 2 TIMES DAILY PRN
Status: DISCONTINUED | OUTPATIENT
Start: 2023-11-14 | End: 2023-11-28

## 2023-11-14 RX ADMIN — AMPICILLIN SODIUM 2000 MG: 2 INJECTION, POWDER, FOR SOLUTION INTRAVENOUS at 12:29

## 2023-11-14 RX ADMIN — PRENATAL WITH FERROUS FUM AND FOLIC ACID 1 TABLET: 3080; 920; 120; 400; 22; 1.84; 3; 20; 10; 1; 12; 200; 27; 25; 2 TABLET ORAL at 09:34

## 2023-11-14 RX ADMIN — AMPICILLIN SODIUM 2000 MG: 2 INJECTION, POWDER, FOR SOLUTION INTRAVENOUS at 18:07

## 2023-11-14 RX ADMIN — SODIUM CHLORIDE, POTASSIUM CHLORIDE, SODIUM LACTATE AND CALCIUM CHLORIDE: 600; 310; 30; 20 INJECTION, SOLUTION INTRAVENOUS at 20:51

## 2023-11-14 RX ADMIN — METRONIDAZOLE 500 MG: 500 TABLET ORAL at 06:04

## 2023-11-14 RX ADMIN — METRONIDAZOLE 500 MG: 500 TABLET ORAL at 18:03

## 2023-11-14 RX ADMIN — AMPICILLIN SODIUM 2000 MG: 2 INJECTION, POWDER, FOR SOLUTION INTRAVENOUS at 00:07

## 2023-11-14 RX ADMIN — SODIUM CHLORIDE, POTASSIUM CHLORIDE, SODIUM LACTATE AND CALCIUM CHLORIDE: 600; 310; 30; 20 INJECTION, SOLUTION INTRAVENOUS at 12:28

## 2023-11-14 RX ADMIN — AMPICILLIN SODIUM 2000 MG: 2 INJECTION, POWDER, FOR SOLUTION INTRAVENOUS at 06:07

## 2023-11-14 RX ADMIN — DOCUSATE SODIUM 100 MG: 100 CAPSULE, LIQUID FILLED ORAL at 09:34

## 2023-11-14 NOTE — CARE PLAN
The patient is Stable - Low risk of patient condition declining or worsening    Shift Goals  Clinical Goals: stay pregnant  Patient Goals: stayt pregnant    Progress made toward(s) clinical / shift goals:  minimal contractions    Patient is not progressing towards the following goals:

## 2023-11-14 NOTE — PROGRESS NOTES
1900: Report received from RASHID Gregory RN. POC discussed.     1920: Pt sitting up in bed, eating dinner. No concerns at this time.    0700: Report given to RODY Colbert RN.

## 2023-11-14 NOTE — PROGRESS NOTES
"0700: Report received from Ivon SANDHU RN. POC discussed.     0730: Assessment done. Pt denies LOF, VB, or UCs/cramping. Reports + FM. Discussed POC with pt and family. All questions answered. Encouraged pt to call with needs.     0735: Dr. Mckinnon on unit. Discussed POC with MD. MD ordering to discontinue magnesium sulfate infusion at this time, remove wang, and for fetal monitoring to be 20 minutes q shift.     0800: External monitors removed.     1119: Pt requesting for external monitors to be placed.    1230: Pt requesting for external monitors be removed.     1430: Pt notified RN of feeling \"one mild contraction\" and has noted light pink, bloody discharge while using bathroom. RN instructed pt to let RN know if any pt's feels any other UCs or cramping. Dr. Christian and Dr. Mckinnon on unit and notified.     1900: Report given to Tena BARRIOS RN. POC discussed.     "

## 2023-11-14 NOTE — CARE PLAN
The patient is Watcher - Medium risk of patient condition declining or worsening    Shift Goals  Clinical Goals: Promote rest  Patient Goals: Rest  Family Goals: Rest    Progress made toward(s) clinical / shift goals:        Problem: Knowledge Deficit - L&D  Goal: Patient and family/caregivers will demonstrate understanding of plan of care, disease process/condition, diagnostic tests and medications  Outcome: Progressing     Problem: Risk for Excess Fluid Volume  Goal: Patient will demonstrate pulse, blood pressure and neurologic signs within expected ranges and without any respiratory complications  Outcome: Progressing

## 2023-11-14 NOTE — CARE PLAN
The patient is Watcher - Medium risk of patient condition declining or worsening    Shift Goals  Clinical Goals: Promote rest  Patient Goals: Rest  Family Goals: Rest    Progress made toward(s) clinical / shift goals:    Problem: Pain  Goal: Patient's pain will be alleviated or reduced to the patient’s comfort goal  Outcome: Progressing   -Pt denies pain. Denies UCs or cramping.     Problem: Risk for Infection and Impaired Wound Healing  Goal: Patient will remain free from infection  Outcome: Progressing   -Pt afebrile. No s/s of infection noted.     Patient is not progressing towards the following goals:

## 2023-11-15 LAB
BACTERIA UR CULT: NORMAL
SIGNIFICANT IND 70042: NORMAL
SITE SITE: NORMAL
SOURCE SOURCE: NORMAL

## 2023-11-15 PROCEDURE — 700105 HCHG RX REV CODE 258: Performed by: OBSTETRICS & GYNECOLOGY

## 2023-11-15 PROCEDURE — 700111 HCHG RX REV CODE 636 W/ 250 OVERRIDE (IP): Performed by: OBSTETRICS & GYNECOLOGY

## 2023-11-15 PROCEDURE — 770002 HCHG ROOM/CARE - OB PRIVATE (112)

## 2023-11-15 PROCEDURE — A9270 NON-COVERED ITEM OR SERVICE: HCPCS | Performed by: OBSTETRICS & GYNECOLOGY

## 2023-11-15 PROCEDURE — 700102 HCHG RX REV CODE 250 W/ 637 OVERRIDE(OP): Performed by: OBSTETRICS & GYNECOLOGY

## 2023-11-15 PROCEDURE — 302790 HCHG STAT ANTEPARTUM CARE, DAILY

## 2023-11-15 PROCEDURE — 36415 COLL VENOUS BLD VENIPUNCTURE: CPT

## 2023-11-15 PROCEDURE — 99232 SBSQ HOSP IP/OBS MODERATE 35: CPT | Performed by: OBSTETRICS & GYNECOLOGY

## 2023-11-15 RX ORDER — INDOMETHACIN 25 MG/1
25 CAPSULE ORAL
Status: DISCONTINUED | OUTPATIENT
Start: 2023-11-15 | End: 2023-11-15

## 2023-11-15 RX ORDER — INDOMETHACIN 50 MG/1
50 CAPSULE ORAL ONCE
Status: DISCONTINUED | OUTPATIENT
Start: 2023-11-15 | End: 2023-11-15

## 2023-11-15 RX ORDER — INDOMETHACIN 50 MG/1
CAPSULE ORAL
Status: DISCONTINUED
Start: 2023-11-15 | End: 2023-11-15

## 2023-11-15 RX ADMIN — AMPICILLIN SODIUM 2000 MG: 2 INJECTION, POWDER, FOR SOLUTION INTRAVENOUS at 00:00

## 2023-11-15 RX ADMIN — METRONIDAZOLE 500 MG: 500 TABLET ORAL at 05:57

## 2023-11-15 RX ADMIN — PRENATAL WITH FERROUS FUM AND FOLIC ACID 1 TABLET: 3080; 920; 120; 400; 22; 1.84; 3; 20; 10; 1; 12; 200; 27; 25; 2 TABLET ORAL at 07:32

## 2023-11-15 RX ADMIN — SODIUM CHLORIDE, POTASSIUM CHLORIDE, SODIUM LACTATE AND CALCIUM CHLORIDE: 600; 310; 30; 20 INJECTION, SOLUTION INTRAVENOUS at 04:39

## 2023-11-15 RX ADMIN — METRONIDAZOLE 500 MG: 500 TABLET ORAL at 18:09

## 2023-11-15 RX ADMIN — SODIUM CHLORIDE, POTASSIUM CHLORIDE, SODIUM LACTATE AND CALCIUM CHLORIDE: 600; 310; 30; 20 INJECTION, SOLUTION INTRAVENOUS at 12:46

## 2023-11-15 ASSESSMENT — PATIENT HEALTH QUESTIONNAIRE - PHQ9
SUM OF ALL RESPONSES TO PHQ9 QUESTIONS 1 AND 2: 0
2. FEELING DOWN, DEPRESSED, IRRITABLE, OR HOPELESS: NOT AT ALL
1. LITTLE INTEREST OR PLEASURE IN DOING THINGS: NOT AT ALL

## 2023-11-15 ASSESSMENT — PAIN DESCRIPTION - PAIN TYPE: TYPE: ACUTE PAIN

## 2023-11-15 NOTE — CONSULTS
Requested by Dr. Man    Indication: Premature labor at 24-5/7 wks with vaginal bleeding.     I met with Ms Lillian Du and Mr. Case.     Ms Lillian Du is a 22 yo G1 P 0 mother who presents at 24- 5/7 wks with PTL and vaginal bleeding with intact membranes.     Mom was transferred from [facility] and completed her steroid window on 11/13. She was treated with Magnesium, Flagl and ampicillin.     Family is expecting a son whose name is to be determined. EFW [id number].     Mom reports being in good health prior to pregnancy.     We discussed the following:  NICU environment, criteria for admission and discharge.   NICU resuscitation. Parents request full resuscitation.   Complications with prematurity at 24 wks.   Increased mortality.  Increased morbidity  - Neurologically: IVH, ROP, Developmental delays, Temp instability, Apnea.  - Respiratory: RDS, CLD, Surfactant, intubation, oxygen  -CV: Chest compression, cardiac medications, PDA  -GI: Mom plans to breast feed, lactation, gavage feeds, Umbilical lines, PICC lines, NEC and feeding intolerence.   -Heme: Anemia, blood transfusions, jaundice and phototherapy.   -ID: Septic screens, antibiotic stewardship.    Parents expressed understanding and their questions were addressed.     Thank you for this consultation. Please contact the NICU for additional questions or concerns.     Total time spent in consultation 55 minutes with 40 minutes of face to face time.

## 2023-11-15 NOTE — PROGRESS NOTES
23-year-old  1 para 0 at 25 weeks gestation.  Patient mated secondary to cervical dilation and  labor.  Has received steroids.  Magnesium sulfate stopped yesterday.    Patient had some bleeding last night.  Cervix is unchanged.  Continue to have some minor spotting    Cervix unchanged.  Patient continued to have some cramping this morning.  Slight spotting.  We will begin Indocin x3 days.    If evidence of labor, will start magnesium for neuro protection.  Transverse fetus, will need , likely classical

## 2023-11-15 NOTE — PROGRESS NOTES
"1900 - Report received from CRYSTAL Belcher.  1915 - Pt reports +FM. Denies LOF. Pt wrote down that she had \"one mild contraction\" at 1750 today. Denies UCs at this time. Pt reports increasing VB and discharge. Bright red, bloody discharge with clear mucus observed on toilet paper by this RN. Dr. Christian notified.   1945 - Dr. Christian at BS. SVE performed by MD remains unchanged. Orders for NPO and continuous monitoring received at this time.  1952 - Pt provided peripads. Pt educated to save pads for provider/RN obs of VB.  2000 - NICU consultant at pt BS.   0045 - This RN observed pt peripad. Light pink discharge observed with x1 marble-sized dark red clot. Pt denies feeling UCs or uterine activity.  0530 - Pt reports some bright red bleeding on toilet paper when up to void. Dr. Christian updated on pt bleeding.   0700 - Report given to CRYSTAL Belcher.  "

## 2023-11-15 NOTE — CARE PLAN
The patient is Watcher - Medium risk of patient condition declining or worsening    Shift Goals  Clinical Goals: monitor VB and uterine activity.  Patient Goals: stay pregnant  Family Goals: emotional support/ remain updated on POC    Progress made toward(s) clinical / shift goals:    Problem: Knowledge Deficit - L&D  Goal: Patient and family/caregivers will demonstrate understanding of plan of care, disease process/condition, diagnostic tests and medications  Outcome: Progressing   -POC discussed with pt and family. All questions answered.     Problem: Pain  Goal: Patient's pain will be alleviated or reduced to the patient’s comfort goal  Outcome: Progressing   -Pt denies pain. States not feeling any UCs or cramping.     Patient is not progressing towards the following goals:

## 2023-11-15 NOTE — CARE PLAN
The patient is Watcher - Medium risk of patient condition declining or worsening    Shift Goals  Clinical Goals: monitor VB and uterine activity.  Patient Goals: stay pregnant  Family Goals: emotional support/ remain updated on POC      Problem: Knowledge Deficit - L&D  Goal: Patient and family/caregivers will demonstrate understanding of plan of care, disease process/condition, diagnostic tests and medications  Outcome: Progressing   POC discussed with pt. Pt verbalizes understanding and asks questions as needed. NICU consultant discussed with pt/FOB the POC if  delivery occurs.    Problem: Risk for Injury  Goal: Patient and fetus will be free of preventable injury/complications  Outcome: Progressing   EFM and toco in place for continuous monitoring of FHR and uterine activity. This RN will continue to assess pt bleeding and s/s of labor.

## 2023-11-15 NOTE — PROGRESS NOTES
Obstetrics & Gynecology Consultation    Date of Service  11/15/2023    Referring Physician  Ephraim Man M.D.    Consulting Physician  Dmitriy Mckinnon Jr., M.D.    Reason for Consultation  Arrested  labor.  Overnight, she has had a small amount of bleeding. She does not have any complaints this am and no further bleeding. There has been no return of contractions.        Review of Systems  ROS    Obstetric History    OB History    Para Term  AB Living   1             SAB IAB Ectopic Molar Multiple Live Births                    # Outcome Date GA Lbr Sly/2nd Weight Sex Delivery Anes PTL Lv   1 Current                    Medical History   has no past medical history on file.    Surgical History   has no past surgical history on file.    Family History  family history is not on file.    Social History   reports that she has never smoked. She has never used smokeless tobacco. She reports that she does not drink alcohol and does not use drugs.    Medications  Prior to Admission Medications   Prescriptions Last Dose Informant Patient Reported? Taking?   Prenatal MV-Min-Fe Fum-FA-DHA (PRENATAL 1 PO) 2023  Yes Yes   Sig: Take 1 Tablet by mouth every day.   aspirin (ASA) 81 MG Chew Tab chewable tablet 2023  Yes Yes   Sig: Chew 81 mg every day.      Facility-Administered Medications: None       Allergies  No Known Allergies    Physical Exam  Vitals:    23 2310 11/15/23 0000 11/15/23 0300 11/15/23 0747   BP: 101/59  105/55 113/53   Pulse: 62  70 66   Resp: 16  19 17   Temp: 36.4 °C (97.5 °F)  36.2 °C (97.1 °F) 36 °C (96.8 °F)   TempSrc: Temporal  Temporal Temporal   SpO2: 95% 93%     Weight:       Height:           General:   alert, cooperative, no distress       Recent Labs     23  0152   SODIUM 136   POTASSIUM 4.2   CHLORIDE 102   CO2 15*   GLUCOSE 113*   BUN 5*   CREATININE 0.55   CALCIUM 8.1*                 Imaging  US-OB LIMITED WITH TRANSVAGINAL (COMBO)   Final Result       Single intrauterine pregnancy of an estimated gestational age of 25 weeks, 1 days with an estimated date of delivery of 02/25/2024.      Marked cervical funneling.          Arrested PTL in 25 week gestation.   Steroids in, cici has seen.  Continue in house with close monitoring for labor. Consider Indocin if contractions return.    Dmitriy Mckinnon Jr., M.D.

## 2023-11-15 NOTE — PROGRESS NOTES
0700: Report received from Tena BARRIOS RN. POC discussed.     0708: Dr. Christian on unit. Updated MD on pt. MD ordering indomethacin.     0730: Assessment done. Pt denies UCs/cramping or LOF. Reports light, pink vaginal bleeding after using the bathroom and + FM. Discussed POC with pt and family. Encouraged pt to call with needs.     0732: Dr. Mckinnon at bedside. POC discussed with pt and family. MD reviewed tracing and ordering to hold indomethacin and take pt off fetal monitoring at this time. MD instructed pt to let RN know if pt begins feeling UCs/cramping or experiences a change in vaginal bleeding. MD ordering to complete 20 minutes of monitoring later in shift.     1436: External monitors applied. Pt denies cramping or UCs.     1501: External monitoring removed.     1900: Report given to Tisha HSIEH RN. POC discussed.

## 2023-11-16 PROBLEM — O60.02 PRETERM LABOR IN SECOND TRIMESTER WITHOUT DELIVERY: Status: ACTIVE | Noted: 2023-11-16

## 2023-11-16 PROCEDURE — 700102 HCHG RX REV CODE 250 W/ 637 OVERRIDE(OP): Performed by: OBSTETRICS & GYNECOLOGY

## 2023-11-16 PROCEDURE — 59025 FETAL NON-STRESS TEST: CPT

## 2023-11-16 PROCEDURE — A9270 NON-COVERED ITEM OR SERVICE: HCPCS | Performed by: OBSTETRICS & GYNECOLOGY

## 2023-11-16 PROCEDURE — 770002 HCHG ROOM/CARE - OB PRIVATE (112)

## 2023-11-16 PROCEDURE — 302790 HCHG STAT ANTEPARTUM CARE, DAILY

## 2023-11-16 RX ADMIN — PRENATAL WITH FERROUS FUM AND FOLIC ACID 1 TABLET: 3080; 920; 120; 400; 22; 1.84; 3; 20; 10; 1; 12; 200; 27; 25; 2 TABLET ORAL at 07:57

## 2023-11-16 RX ADMIN — METRONIDAZOLE 500 MG: 500 TABLET ORAL at 06:03

## 2023-11-16 RX ADMIN — METRONIDAZOLE 500 MG: 500 TABLET ORAL at 17:54

## 2023-11-16 ASSESSMENT — PAIN DESCRIPTION - PAIN TYPE: TYPE: ACUTE PAIN

## 2023-11-16 NOTE — PROGRESS NOTES
0100 Report received from Tisha ROJAS, patient encouraged to call out with needs.    0700 report given to Alessia ROJAS, care relinquished.

## 2023-11-16 NOTE — CARE PLAN
The patient is Watcher - Medium risk of patient condition declining or worsening    Shift Goals  Clinical Goals: stay pregnant  Patient Goals: rest and relax  Family Goals: emotional support    Progress made toward(s) clinical / shift goals:    Problem: Knowledge Deficit - L&D  Goal: Patient and family/caregivers will demonstrate understanding of plan of care, disease process/condition, diagnostic tests and medications  Outcome: Progressing     Problem: Psychosocial - L&D  Goal: Patient's level of anxiety will decrease  Outcome: Progressing  Goal: Patient will be able to discuss coping skills during hospitalization  Outcome: Progressing       Patient is not progressing towards the following goals:

## 2023-11-16 NOTE — CARE PLAN
The patient is Stable - Low risk of patient condition declining or worsening    Shift Goals  Clinical Goals: stay pregnant  Patient Goals: rest and relax  Family Goals: emotional support    Progress made toward(s) clinical / shift goals:    Problem: Knowledge Deficit - L&D  Goal: Patient and family/caregivers will demonstrate understanding of plan of care, disease process/condition, diagnostic tests and medications  Outcome: Progressing     Problem: Psychosocial - L&D  Goal: Patient's level of anxiety will decrease  Outcome: Progressing  Goal: Patient will be able to discuss coping skills during hospitalization  Outcome: Progressing     Problem: Pain  Goal: Patient's pain will be alleviated or reduced to the patient’s comfort goal  Outcome: Progressing     Problem: Risk for Injury  Goal: Patient and fetus will be free of preventable injury/complications  Outcome: Progressing

## 2023-11-16 NOTE — PROGRESS NOTES
0700: Received report from Jewels ROJAS, plan of care discussed. Call light in reach, pt encouraged to use when in need of assistance.    0830: RN at bedside, pt states she has only had a tiny bit of pink on pad, pt understands to notify RN if there are any changes in bleeding or contractions. Pt is able to go on a wheelchair ride on the unit with family.     1900: Report given to Tena ROJAS, plan of care discussed.

## 2023-11-16 NOTE — PROGRESS NOTES
"[facility] ANTEPARTUM PROGRESS NOTE;    Beka Du is a 23 y.o. female  at 25w1d.    Patient Active Problem List    Diagnosis Date Noted     labor in second trimester without delivery 2023    Indication for care in labor or delivery 2023         SUBJECTIVE:  Patient seen and examined. She is doing well. Reports one contraction at 550am, pt was not on the monitor at that time. No contractions since. Bleeding getting lighter, was spotting yesterday and now mild blood tinged discharge. Denies HA, change in vision, RUQ/epigastric pain, SOB, CP, fever, nausea/vomiting, edema or calf pain.     Contractions: one over night  Leaking of fluid: none   Vaginal bleeding: improving  Fetal movement: good     Review of systems; denies vaginal bleeding, leakage of fluid, uterine contractions, fever chills or abdominal pain      Physical examination;  /53   Pulse 68   Temp 36.3 °C (97.3 °F) (Temporal)   Resp 16   Ht 1.651 m (5' 5\")   Wt 88.9 kg (196 lb)   SpO2 94%   BMI 32.62 kg/m²   Appearance/Psychiatric: appears well and in NAD  Constitutional: The patient is well nourished.  Respiratory: Her respiratory effort is normal.  Gastrointestinal: Soft, gravid, non-tender  Extremities: no edema    NST-as performed and read by myself; reactive NST without contractions    Assessment  IUP AT 25w1d   Ms. Lillian Du is a 23 y.o. year old female at 25w1d on admitted with arrested  labor. Patient doing well and stable. Will continue to monitor as she does continue to have intermittent symptoms.        Plan;  1. Continue observation, monitor closely for bleeding and contractions   2. NST daily   3. Consider indomethacin if contractions persist         Jerri Allred P.A.-C.  [facility] Service   Obstetrics and Gynecology  2023     8:44 AM    "

## 2023-11-16 NOTE — PROGRESS NOTES
1900: Report received from CRYSTAL Belcher. POC discussed. Care assumed. No s/s of distress.     0100: Report given to CRYSTAL Donis. POC discussed. Care relinquished.

## 2023-11-17 PROCEDURE — 59025 FETAL NON-STRESS TEST: CPT

## 2023-11-17 PROCEDURE — 302790 HCHG STAT ANTEPARTUM CARE, DAILY

## 2023-11-17 PROCEDURE — 700102 HCHG RX REV CODE 250 W/ 637 OVERRIDE(OP): Performed by: OBSTETRICS & GYNECOLOGY

## 2023-11-17 PROCEDURE — A9270 NON-COVERED ITEM OR SERVICE: HCPCS | Performed by: OBSTETRICS & GYNECOLOGY

## 2023-11-17 PROCEDURE — 770002 HCHG ROOM/CARE - OB PRIVATE (112)

## 2023-11-17 PROCEDURE — 99231 SBSQ HOSP IP/OBS SF/LOW 25: CPT | Performed by: OBSTETRICS & GYNECOLOGY

## 2023-11-17 RX ADMIN — METRONIDAZOLE 500 MG: 500 TABLET ORAL at 18:03

## 2023-11-17 RX ADMIN — PRENATAL WITH FERROUS FUM AND FOLIC ACID 1 TABLET: 3080; 920; 120; 400; 22; 1.84; 3; 20; 10; 1; 12; 200; 27; 25; 2 TABLET ORAL at 08:20

## 2023-11-17 RX ADMIN — METRONIDAZOLE 500 MG: 500 TABLET ORAL at 06:02

## 2023-11-17 ASSESSMENT — PATIENT HEALTH QUESTIONNAIRE - PHQ9
2. FEELING DOWN, DEPRESSED, IRRITABLE, OR HOPELESS: NOT AT ALL
SUM OF ALL RESPONSES TO PHQ9 QUESTIONS 1 AND 2: 0
1. LITTLE INTEREST OR PLEASURE IN DOING THINGS: NOT AT ALL

## 2023-11-17 NOTE — PROGRESS NOTES
"1900 - Report received from CRYSTAL Aggarwal.   1920 - Pt denies LOF/ pain. Pt reports occasional VB and \"mild\" UCs/ +FM. Pt left most recent pad for RN to observe. Mucus mixed with mild bright red blood was visualized.  0700 - Report given to CRYSTAL Aggarwal. POC discussed.  "

## 2023-11-17 NOTE — PROGRESS NOTES
ANTEPARTUM PROGRESS NOTE;    Beka Du is a 23 y.o. female  at 25w2d.  Pt was admitted with PTL and bulging membranes, patient has arrested labor and is currently on hospital day #6  Patient is being treated with metronidazole for bacterial vaginosis  Patient was given ampicillin 2000 mg on November 13-15, patient is group B strep positive  Patient has been treated with magnesium sulfate and betamethasone  Patient's cervix on admission was 3 cm dilation at last exam on November 15, 2023 she was unchanged, there were no longer bulging membranes noted    Currently patient is laying comfortably in bed in no distress, she expresses no complaints and states she is doing well and understands that she will need to be with us hopefully for quite a while gaining time for her baby's maturation.      Patient Active Problem List    Diagnosis Date Noted     labor in second trimester without delivery 2023    Indication for care in labor or delivery 2023       Review of systems; denies vaginal bleeding, leakage of fluid, uterine contractions, fever chills or abdominal pain  History reviewed. No pertinent past medical history.  No past surgical history on file.  Patient has no known allergies.  Social History     Socioeconomic History    Marital status: Single     Spouse name: Not on file    Number of children: Not on file    Years of education: Not on file    Highest education level: Not on file   Occupational History    Not on file   Tobacco Use    Smoking status: Never    Smokeless tobacco: Never   Vaping Use    Vaping Use: Never used   Substance and Sexual Activity    Alcohol use: Never    Drug use: Never    Sexual activity: Not on file   Other Topics Concern    Not on file   Social History Narrative    Not on file     Social Determinants of Health     Financial Resource Strain: Not on file   Food Insecurity: Not on file   Transportation Needs: Not on file   Physical Activity: Not  "on file   Stress: Not on file   Social Connections: Not on file   Intimate Partner Violence: Not on file   Housing Stability: Not on file         Physical examination;  Alert and oriented x3  Gen.-well-developed well-nourished female in no apparent distress  HEENT-normocephalic, nontraumatic,EOMI,PERRLA  /57   Pulse 71   Temp 36.2 °C (97.2 °F) (Temporal)   Resp 16   Ht 1.651 m (5' 5\")   Wt 88.9 kg (196 lb)   SpO2 97%   BMI 32.62 kg/m²   Skin is warm and dry  Back-negative for CVA tenderness  Cardiovascular-regular rate and rhythm, normal S1-S2 no murmurs gallops  Lungs-clear to stitch bilaterally  Abdomen-nondistended positive bowel sounds soft nontender without masses or hepatosplenomegaly  Cervix-deferred  Extremities without cyanosis clubbing or edema  Neurologic grossly intact      NST-as performed and read by myself; reactive NST without contractions    Impression;  IUP AT 25w2d   labor with advanced dilation at 3 cm, patient has received magnesium sulfate and betamethasone, patient is group B strep positive and received IV ampicillin for 3 days and a dose of azithromycin upon arrival  Bacterial vaginosis, patient is on oral Flagyl  Plan;  Continue oral Flagyl  Continue modified bedrest in the hospital given significant prematurity and high risk for  delivery  Expectant management, repeat dosage with magnesium sulfate if delivery appears imminent         "

## 2023-11-17 NOTE — CARE PLAN
The patient is Watcher - Medium risk of patient condition declining or worsening    Shift Goals  Clinical Goals: Monitor for VB/ uterine activity. Stay pregnant  Patient Goals: healthy mom, healthy baby  Family Goals: emotional support      Problem: Knowledge Deficit - L&D  Goal: Patient and family/caregivers will demonstrate understanding of plan of care, disease process/condition, diagnostic tests and medications  Outcome: Progressing   POC discussed with pt. Pt verbalizes understanding and asks questions as needed.    Problem: Risk for Injury  Goal: Patient and fetus will be free of preventable injury/complications  Outcome: Progressing  This RN will continue to assess pt bleeding and s/s of labor. Pt head to toe complete as baseline for any changes in pt condition.

## 2023-11-18 LAB
ALBUMIN SERPL BCP-MCNC: 3.3 G/DL (ref 3.2–4.9)
ALBUMIN/GLOB SERPL: 1.2 G/DL
ALP SERPL-CCNC: 75 U/L (ref 30–99)
ALT SERPL-CCNC: 12 U/L (ref 2–50)
ANION GAP SERPL CALC-SCNC: 10 MMOL/L (ref 7–16)
AST SERPL-CCNC: 11 U/L (ref 12–45)
BILIRUB SERPL-MCNC: 0.2 MG/DL (ref 0.1–1.5)
BUN SERPL-MCNC: 6 MG/DL (ref 8–22)
CALCIUM ALBUM COR SERPL-MCNC: 8.8 MG/DL (ref 8.5–10.5)
CALCIUM SERPL-MCNC: 8.2 MG/DL (ref 8.5–10.5)
CHLORIDE SERPL-SCNC: 102 MMOL/L (ref 96–112)
CO2 SERPL-SCNC: 22 MMOL/L (ref 20–33)
CREAT SERPL-MCNC: 0.46 MG/DL (ref 0.5–1.4)
ERYTHROCYTE [DISTWIDTH] IN BLOOD BY AUTOMATED COUNT: 42.5 FL (ref 35.9–50)
GFR SERPLBLD CREATININE-BSD FMLA CKD-EPI: 136 ML/MIN/1.73 M 2
GLOBULIN SER CALC-MCNC: 2.8 G/DL (ref 1.9–3.5)
GLUCOSE SERPL-MCNC: 95 MG/DL (ref 65–99)
HCT VFR BLD AUTO: 35.8 % (ref 37–47)
HGB BLD-MCNC: 11.8 G/DL (ref 12–16)
MAGNESIUM SERPL-MCNC: 3.7 MG/DL (ref 1.5–2.5)
MAGNESIUM SERPL-MCNC: 5.1 MG/DL (ref 1.5–2.5)
MAGNESIUM SERPL-MCNC: 5.3 MG/DL (ref 1.5–2.5)
MCH RBC QN AUTO: 29.6 PG (ref 27–33)
MCHC RBC AUTO-ENTMCNC: 33 G/DL (ref 32.2–35.5)
MCV RBC AUTO: 89.9 FL (ref 81.4–97.8)
PLATELET # BLD AUTO: 315 K/UL (ref 164–446)
PMV BLD AUTO: 9.9 FL (ref 9–12.9)
POTASSIUM SERPL-SCNC: 3.6 MMOL/L (ref 3.6–5.5)
PROT SERPL-MCNC: 6.1 G/DL (ref 6–8.2)
RBC # BLD AUTO: 3.98 M/UL (ref 4.2–5.4)
SODIUM SERPL-SCNC: 134 MMOL/L (ref 135–145)
WBC # BLD AUTO: 13.1 K/UL (ref 4.8–10.8)

## 2023-11-18 PROCEDURE — 700102 HCHG RX REV CODE 250 W/ 637 OVERRIDE(OP): Performed by: OBSTETRICS & GYNECOLOGY

## 2023-11-18 PROCEDURE — 36415 COLL VENOUS BLD VENIPUNCTURE: CPT

## 2023-11-18 PROCEDURE — 85027 COMPLETE CBC AUTOMATED: CPT

## 2023-11-18 PROCEDURE — 302790 HCHG STAT ANTEPARTUM CARE, DAILY

## 2023-11-18 PROCEDURE — 80053 COMPREHEN METABOLIC PANEL: CPT

## 2023-11-18 PROCEDURE — A9270 NON-COVERED ITEM OR SERVICE: HCPCS | Performed by: OBSTETRICS & GYNECOLOGY

## 2023-11-18 PROCEDURE — 700111 HCHG RX REV CODE 636 W/ 250 OVERRIDE (IP): Mod: JZ

## 2023-11-18 PROCEDURE — 770002 HCHG ROOM/CARE - OB PRIVATE (112)

## 2023-11-18 PROCEDURE — 99232 SBSQ HOSP IP/OBS MODERATE 35: CPT | Performed by: OBSTETRICS & GYNECOLOGY

## 2023-11-18 PROCEDURE — 700111 HCHG RX REV CODE 636 W/ 250 OVERRIDE (IP): Performed by: OBSTETRICS & GYNECOLOGY

## 2023-11-18 PROCEDURE — 700105 HCHG RX REV CODE 258: Performed by: OBSTETRICS & GYNECOLOGY

## 2023-11-18 PROCEDURE — 99231 SBSQ HOSP IP/OBS SF/LOW 25: CPT | Performed by: OBSTETRICS & GYNECOLOGY

## 2023-11-18 PROCEDURE — 83735 ASSAY OF MAGNESIUM: CPT | Mod: 91

## 2023-11-18 RX ORDER — MAGNESIUM SULFATE HEPTAHYDRATE 40 MG/ML
INJECTION, SOLUTION INTRAVENOUS
Status: COMPLETED
Start: 2023-11-18 | End: 2023-11-18

## 2023-11-18 RX ORDER — MAGNESIUM SULFATE HEPTAHYDRATE 40 MG/ML
INJECTION, SOLUTION INTRAVENOUS
Status: ACTIVE
Start: 2023-11-18 | End: 2023-11-18

## 2023-11-18 RX ORDER — MAGNESIUM SULFATE HEPTAHYDRATE 40 MG/ML
4 INJECTION, SOLUTION INTRAVENOUS ONCE
Status: COMPLETED | OUTPATIENT
Start: 2023-11-18 | End: 2023-11-18

## 2023-11-18 RX ORDER — SODIUM CHLORIDE, SODIUM LACTATE, POTASSIUM CHLORIDE, CALCIUM CHLORIDE 600; 310; 30; 20 MG/100ML; MG/100ML; MG/100ML; MG/100ML
INJECTION, SOLUTION INTRAVENOUS CONTINUOUS
Status: DISCONTINUED | OUTPATIENT
Start: 2023-11-18 | End: 2023-11-19

## 2023-11-18 RX ORDER — MAGNESIUM SULFATE HEPTAHYDRATE 40 MG/ML
2 INJECTION, SOLUTION INTRAVENOUS CONTINUOUS
Status: DISPENSED | OUTPATIENT
Start: 2023-11-18 | End: 2023-11-19

## 2023-11-18 RX ORDER — DEXTROSE, SODIUM CHLORIDE, SODIUM LACTATE, POTASSIUM CHLORIDE, AND CALCIUM CHLORIDE 5; .6; .31; .03; .02 G/100ML; G/100ML; G/100ML; G/100ML; G/100ML
INJECTION, SOLUTION INTRAVENOUS CONTINUOUS
Status: DISCONTINUED | OUTPATIENT
Start: 2023-11-18 | End: 2023-11-19

## 2023-11-18 RX ORDER — ONDANSETRON 2 MG/ML
4 INJECTION INTRAMUSCULAR; INTRAVENOUS EVERY 4 HOURS PRN
Status: DISCONTINUED | OUTPATIENT
Start: 2023-11-18 | End: 2023-11-28 | Stop reason: HOSPADM

## 2023-11-18 RX ORDER — CALCIUM GLUCONATE 94 MG/ML
1 INJECTION, SOLUTION INTRAVENOUS
Status: DISCONTINUED | OUTPATIENT
Start: 2023-11-18 | End: 2023-11-21

## 2023-11-18 RX ORDER — INDOMETHACIN 50 MG/1
50 CAPSULE ORAL EVERY 6 HOURS
Status: COMPLETED | OUTPATIENT
Start: 2023-11-18 | End: 2023-11-20

## 2023-11-18 RX ADMIN — AMPICILLIN INJECTION 2 G: 2 POWDER, FOR SOLUTION INTRAMUSCULAR; INTRAVENOUS at 18:24

## 2023-11-18 RX ADMIN — METRONIDAZOLE 500 MG: 500 TABLET ORAL at 06:01

## 2023-11-18 RX ADMIN — MAGNESIUM SULFATE HEPTAHYDRATE 4 G: 4 INJECTION, SOLUTION INTRAVENOUS at 02:26

## 2023-11-18 RX ADMIN — MAGNESIUM SULFATE HEPTAHYDRATE 2 G/HR: 40 INJECTION, SOLUTION INTRAVENOUS at 02:46

## 2023-11-18 RX ADMIN — SODIUM CHLORIDE, SODIUM LACTATE, POTASSIUM CHLORIDE, CALCIUM CHLORIDE AND DEXTROSE MONOHYDRATE: 5; 600; 310; 30; 20 INJECTION, SOLUTION INTRAVENOUS at 20:24

## 2023-11-18 RX ADMIN — METRONIDAZOLE 500 MG: 500 TABLET ORAL at 18:23

## 2023-11-18 RX ADMIN — INDOMETHACIN 50 MG: 50 CAPSULE ORAL at 21:02

## 2023-11-18 RX ADMIN — AMPICILLIN INJECTION 2 G: 2 POWDER, FOR SOLUTION INTRAMUSCULAR; INTRAVENOUS at 12:02

## 2023-11-18 RX ADMIN — INDOMETHACIN 50 MG: 50 CAPSULE ORAL at 15:09

## 2023-11-18 RX ADMIN — AMPICILLIN INJECTION 2 G: 2 POWDER, FOR SOLUTION INTRAMUSCULAR; INTRAVENOUS at 04:01

## 2023-11-18 RX ADMIN — MAGNESIUM SULFATE HEPTAHYDRATE 2 G/HR: 40 INJECTION, SOLUTION INTRAVENOUS at 21:09

## 2023-11-18 RX ADMIN — SODIUM CHLORIDE, POTASSIUM CHLORIDE, SODIUM LACTATE AND CALCIUM CHLORIDE: 600; 310; 30; 20 INJECTION, SOLUTION INTRAVENOUS at 15:59

## 2023-11-18 RX ADMIN — SODIUM CHLORIDE, POTASSIUM CHLORIDE, SODIUM LACTATE AND CALCIUM CHLORIDE: 600; 310; 30; 20 INJECTION, SOLUTION INTRAVENOUS at 02:14

## 2023-11-18 RX ADMIN — PRENATAL WITH FERROUS FUM AND FOLIC ACID 1 TABLET: 3080; 920; 120; 400; 22; 1.84; 3; 20; 10; 1; 12; 200; 27; 25; 2 TABLET ORAL at 08:41

## 2023-11-18 RX ADMIN — SODIUM CHLORIDE, POTASSIUM CHLORIDE, SODIUM LACTATE AND CALCIUM CHLORIDE: 600; 310; 30; 20 INJECTION, SOLUTION INTRAVENOUS at 02:36

## 2023-11-18 RX ADMIN — INDOMETHACIN 50 MG: 50 CAPSULE ORAL at 08:41

## 2023-11-18 NOTE — PROGRESS NOTES
ANTEPARTUM PROGRESS NOTE;    Beka Du is a 23 y.o. female  at 25w3d.  Patient was admitted on 2023 with 3 cm of dilation and a bulging bag.  Her history was consistent with  labor.  Patient was admitted and treated with magnesium sulfate and betamethasone.  The patient did not progress further in labor.  The patient was transverse at the time of admission.    I was called by the patient's nurse as the patient was complaining that she felt movement of the baby in her rectum.  The nurse placed the patient on the monitor and did not see any more than 3 contractions in an hour and the fetal heart rate tracing was reassuring.    Patient Active Problem List    Diagnosis Date Noted     labor in second trimester without delivery 2023    Indication for care in labor or delivery 2023       Review of systems; denies vaginal bleeding, leakage of fluid, uterine contractions, fever chills or abdominal pain  History reviewed. No pertinent past medical history.  No past surgical history on file.  Patient has no known allergies.  Social History     Socioeconomic History    Marital status: Single     Spouse name: Not on file    Number of children: Not on file    Years of education: Not on file    Highest education level: Not on file   Occupational History    Not on file   Tobacco Use    Smoking status: Never    Smokeless tobacco: Never   Vaping Use    Vaping Use: Never used   Substance and Sexual Activity    Alcohol use: Never    Drug use: Never    Sexual activity: Not on file   Other Topics Concern    Not on file   Social History Narrative    Not on file     Social Determinants of Health     Financial Resource Strain: Not on file   Food Insecurity: Not on file   Transportation Needs: Not on file   Physical Activity: Not on file   Stress: Not on file   Social Connections: Not on file   Intimate Partner Violence: Not on file   Housing Stability: Not on file  "        Physical examination;  Alert and oriented x3  Gen.-well-developed well-nourished female in no apparent distress  HEENT-normocephalic, nontraumatic,EOMI,PERRLA  /62   Pulse (!) 102   Temp 36.2 °C (97.2 °F) (Temporal)   Resp 16   Ht 1.651 m (5' 5\")   Wt 88.9 kg (196 lb)   SpO2 97%   BMI 32.62 kg/m²   Skin is warm and dry  Back-negative for CVA tenderness  Abdomen-nondistended positive bowel sounds soft nontender without masses or hepatosplenomegaly    Cervix-dilated to 3 cm and fetal parts palpable at the internal os at the 0 station.  Bag of water intact.    Bedside ultrasound-baby is in the complete breech presentation with breech and feet presenting at the internal os      Impression;  IUP AT 25w3d with cervical dilation to 3 cm.  Patient cervical dilation is not advanced since admission and patient is not having regular contractions however fetal position has changed so that now the complete breech is at the internal os resulting in perception of fetal movement in the upper vagina and rectum  It is possible patient will now progress to inevitable delivery, however as she is not having contractions and cervical dilation has not changed it is possible that she will not progress to delivery today.  Magnesium sulfate is restarted for neuro protection.  Patient is placed again on continuous monitoring.  Patient is placed in Trendelenburg position.    Plan;  Restart magnesium sulfate for neuro protection  Trendelenburg position as tolerated by patient  Continuous monitoring for evidence of labor or fetal compromise  Proceed to  section if evidence of fetal compromise or other necessary indication for  section delivery         " No

## 2023-11-18 NOTE — PROGRESS NOTES
0700 - Assumed care of pt. Report received from Tena ROJAS. Pt denies any cramping, UCs, LOF, bleeding. +FM. Pt in trendelenburg positioning still. Dr. Mckinnon to bedside.   RN to keep pt in trendelenburg, keep mag running, and NPO. Indocin began per Dr. Mckinnon order. Plan for RN to call and update MD around 1400.  1408 - Call placed to Dr. Mckinnon for planned update on pt. Pt contracts about twice an hour and pt feels uncomfortable when bertha but states they aren't worsening. Plan too keep everything as is.   1700 - Dr. Mckinnon in to see pt. MD spoke with Dr. Boles. No new orders received.   1855 - Report to Selena ROJAS.

## 2023-11-18 NOTE — PROGRESS NOTES
Obstetrics & Gynecology Consultation    Date of Service  2023    Referring Physician  Ephraim Man M.D.    Consulting Physician  Dmitriy Mckinnon Jr., M.D.    Reason for Consultation  Arrested PTL    History of Presenting Illness    A few contractions yesterday but no cervical change from admission. Mag was restarted. Baby is now breech.    Review of Systems  ROS    Obstetric History    OB History    Para Term  AB Living   1             SAB IAB Ectopic Molar Multiple Live Births                    # Outcome Date GA Lbr Sly/2nd Weight Sex Delivery Anes PTL Lv   1 Current                    Medical History   has no past medical history on file.    Surgical History   has no past surgical history on file.    Family History  family history is not on file.    Social History   reports that she has never smoked. She has never used smokeless tobacco. She reports that she does not drink alcohol and does not use drugs.    Medications  Prior to Admission Medications   Prescriptions Last Dose Informant Patient Reported? Taking?   Prenatal MV-Min-Fe Fum-FA-DHA (PRENATAL 1 PO) 2023  Yes Yes   Sig: Take 1 Tablet by mouth every day.   aspirin (ASA) 81 MG Chew Tab chewable tablet 2023  Yes Yes   Sig: Chew 81 mg every day.      Facility-Administered Medications: None       Allergies  No Known Allergies    Physical Exam  Vitals:    23 0545 23 0600 23 0640 23 0700   BP: 109/58 106/59 105/57 110/59   Pulse: 77 81 80 80   Resp:  14  14   Temp:  36.2 °C (97.2 °F)     TempSrc:  Temporal     SpO2:  97%  95%   Weight:       Height:           Recent Labs     23  0458   WBC 13.1*   RBC 3.98*   HEMOGLOBIN 11.8*   HEMATOCRIT 35.8*   MCV 89.9   MCH 29.6   MCHC 33.0   RDW 42.5   PLATELETCT 315   MPV 9.9     Recent Labs     23  0458   SODIUM 134*   POTASSIUM 3.6   CHLORIDE 102   CO2 22   GLUCOSE 95   BUN 6*   CREATININE 0.46*   CALCIUM 8.2*              Imaging  US-OB LIMITED  WITH TRANSVAGINAL (COMBO)   Final Result      Single intrauterine pregnancy of an estimated gestational age of 25 weeks, 1 days with an estimated date of delivery of 02/25/2024.      Marked cervical funneling.        Stable at this time, no contractions on monitor. GBS positive.   Will reassess later today and should be able to stop Mg is continues asymptomatic.     Dmitriy Mckinnon Jr., M.D.    On further review, it appears there may have been cervical change since admission. I am initiating Indocin.    Dmitriy Mckinnon Jr., M.D.

## 2023-11-18 NOTE — PROGRESS NOTES
0700: Received report from Tena ROJAS, plan of care discussed. Call light in reach, pt encouraged to use when in need of assistance. Pt states she has minimal pink and vag bleeding has been going away, pt understands to notify RN if anything changes.     1900: Report given to Sierra ROJAS, plan of care discussed.

## 2023-11-18 NOTE — PROGRESS NOTES
"1900: Report received from Alessia PEREZ RN; POC discussed.    1930: In to see pt; partner at bedside; supportive.  No needs identified at this time.    2115: monitors applied for evening monitoring.    0015: pt called; states she can feel baby \"kicking me down there.\"  VS taken and toco applied for observation.  Informed pt that she may feel more lower movements if  baby becomes breech.    0126: Dr Ho called and given update. [place] has shown mild irritability with 3-4 uc's in an hour. Pt's report of baby moving \"down there\" given.  No new orders received, may remove toco and reapply as needed.     0147: Dr Ho at bedside for assessment. SVE done by MD; fetal parts felt like breech presentation. US was brought to bedside where breech was confirmed.  0204: pt placed in trendelenburg position; monitors applied.  0224: wang placed and sequentials.  0226: magnesium sulfate bolus of 4GM initiated.  0246: magnesium maintenance dose @ 2GM/hr=50cc/hr. Pt states at this time tolerating well.    0700: report to Dana ROJAS; POC reviewed and care of pt relinquished.     "

## 2023-11-19 LAB
MAGNESIUM SERPL-MCNC: 5.2 MG/DL (ref 1.5–2.5)
MAGNESIUM SERPL-MCNC: 5.2 MG/DL (ref 1.5–2.5)

## 2023-11-19 PROCEDURE — 700102 HCHG RX REV CODE 250 W/ 637 OVERRIDE(OP): Performed by: OBSTETRICS & GYNECOLOGY

## 2023-11-19 PROCEDURE — 36415 COLL VENOUS BLD VENIPUNCTURE: CPT

## 2023-11-19 PROCEDURE — 99232 SBSQ HOSP IP/OBS MODERATE 35: CPT | Performed by: OBSTETRICS & GYNECOLOGY

## 2023-11-19 PROCEDURE — A9270 NON-COVERED ITEM OR SERVICE: HCPCS | Performed by: OBSTETRICS & GYNECOLOGY

## 2023-11-19 PROCEDURE — 770002 HCHG ROOM/CARE - OB PRIVATE (112)

## 2023-11-19 PROCEDURE — 302790 HCHG STAT ANTEPARTUM CARE, DAILY

## 2023-11-19 PROCEDURE — 700105 HCHG RX REV CODE 258: Performed by: OBSTETRICS & GYNECOLOGY

## 2023-11-19 PROCEDURE — 59025 FETAL NON-STRESS TEST: CPT

## 2023-11-19 PROCEDURE — 700111 HCHG RX REV CODE 636 W/ 250 OVERRIDE (IP): Performed by: OBSTETRICS & GYNECOLOGY

## 2023-11-19 PROCEDURE — 83735 ASSAY OF MAGNESIUM: CPT

## 2023-11-19 RX ADMIN — METRONIDAZOLE 500 MG: 500 TABLET ORAL at 06:04

## 2023-11-19 RX ADMIN — DOCUSATE SODIUM 100 MG: 100 CAPSULE, LIQUID FILLED ORAL at 12:03

## 2023-11-19 RX ADMIN — AMPICILLIN INJECTION 2 G: 2 POWDER, FOR SOLUTION INTRAMUSCULAR; INTRAVENOUS at 00:23

## 2023-11-19 RX ADMIN — SODIUM CHLORIDE, POTASSIUM CHLORIDE, SODIUM LACTATE AND CALCIUM CHLORIDE: 600; 310; 30; 20 INJECTION, SOLUTION INTRAVENOUS at 04:02

## 2023-11-19 RX ADMIN — AMPICILLIN INJECTION 2 G: 2 POWDER, FOR SOLUTION INTRAMUSCULAR; INTRAVENOUS at 06:09

## 2023-11-19 RX ADMIN — INDOMETHACIN 50 MG: 50 CAPSULE ORAL at 03:17

## 2023-11-19 RX ADMIN — METRONIDAZOLE 500 MG: 500 TABLET ORAL at 18:00

## 2023-11-19 RX ADMIN — INDOMETHACIN 50 MG: 50 CAPSULE ORAL at 14:54

## 2023-11-19 RX ADMIN — PRENATAL WITH FERROUS FUM AND FOLIC ACID 1 TABLET: 3080; 920; 120; 400; 22; 1.84; 3; 20; 10; 1; 12; 200; 27; 25; 2 TABLET ORAL at 08:29

## 2023-11-19 RX ADMIN — INDOMETHACIN 50 MG: 50 CAPSULE ORAL at 20:29

## 2023-11-19 RX ADMIN — INDOMETHACIN 50 MG: 50 CAPSULE ORAL at 08:29

## 2023-11-19 NOTE — PROGRESS NOTES
MFM CONSULTATION:  S: No complaints, feeling better off magnesium sulfate.  O: Patient Vitals for the past 24 hrs:   BP Temp Temp src Pulse Resp SpO2   11/19/23 1257 109/51 36.4 °C (97.5 °F) Temporal 73 16 95 %   11/19/23 1030 112/54 36.1 °C (97 °F) Temporal 61 16 95 %   11/19/23 1000 112/54 -- -- 63 -- --   11/19/23 0900 103/58 -- -- 63 -- --   11/19/23 0843 107/58 37 °C (98.6 °F) Temporal 68 16 95 %   11/19/23 0800 98/56 -- -- 63 -- --   11/19/23 0702 106/57 -- -- 72 -- --   11/19/23 0600 96/51 -- -- 64 16 96 %   11/19/23 0500 99/51 -- -- 64 -- 95 %   11/19/23 0400 99/55 36.7 °C (98 °F) Temporal 69 15 97 %   11/19/23 0300 101/53 -- -- 71 -- 96 %   11/19/23 0200 103/57 -- -- 74 -- 95 %   11/19/23 0100 106/56 -- -- 73 16 97 %   11/19/23 0000 106/55 36.4 °C (97.5 °F) Temporal 78 15 95 %   11/18/23 2300 106/52 -- -- 76 -- 97 %   11/18/23 2200 105/54 -- -- 87 -- 96 %   11/18/23 2100 114/56 -- -- 86 -- --   11/18/23 2035 112/57 -- -- 78 -- --   11/18/23 2005 114/58 36.4 °C (97.6 °F) Temporal 82 16 97 %   11/18/23 1905 106/58 -- -- 72 -- --   11/18/23 1800 111/55 36.1 °C (97 °F) Temporal 75 14 97 %   11/18/23 1700 109/54 -- -- 75 -- --   11/18/23 1612 101/57 35.8 °C (96.5 °F) Temporal 76 12 97 %   11/18/23 1600 106/55 -- -- 73 -- --     EFM: Moderate variability and accelerations present.  No contractions or decelerations.  A: IUP 25 4/7 weeks       Cervical funneling and dilated to 3 cm       No contractions on indomethacin.       GBS positive        Breech  P:  Concur with indomethacin for 48 to 72 hours.          Consider po nifedipine post indomethacin        Consider intermittent fetal monitoring if stable today.        GBS prophylaxis prior to delivery.        Restart magnesium sulfate for neuroprotection if delivery indicated.          Moderate complex MDM.  Moderate

## 2023-11-19 NOTE — PROGRESS NOTES
"Report received from Yee ALBARADO RN  Pt resting at this time with no complaints  Edema noted in pt's face.  Pt continues in trendelenberg position, magnesium running at 2gm/hour.       0815 - Pt resting with eyes closed.  RN assessed lungs, lungs clear bilaterally with some diminished sound in right base. Pt denies feeling contractions, states yesterday the contractions felt \"lighter\".  Magnesium turned off - order  at 0200 this morning.      0835 - Incentive spirometer provided to patient and patient instructed on the use.      0845 - Pt able to demonstrate use of incentive spirometer.  RN educated patient on use and proper technique.  RN encouraged patient to use 10x hour while awake.      1100 - Advanced to regular diet    1230 - Pt had a regular diet and tolerated it well.  Pt states feeling much better after eating and magnesium being turned off.  Pt denies feeling any contractions.      1300 - Pt using incentive spirometer and pulse ox has range between 94-98%.        "

## 2023-11-19 NOTE — PROGRESS NOTES
1900: Report received from Abby ROJAS; POC discussed. Pt remains on trendelenburg and NPO. Magnesium therapy continues.   1910: pt has no needs at this time except she is hungry. No vaginal bleeding or leaking at this time.    1955: Dr Mckinnon contacted to confirm NPO statues for the night. MD also acknowledged to initiate D5LR.    2300: In to see pt; pt at this time has no needs. Denies nausea and states her contractions have diminished in intensity.      0700: report given to Karlee JURADO RN; POC discussed.

## 2023-11-20 PROCEDURE — 99231 SBSQ HOSP IP/OBS SF/LOW 25: CPT | Performed by: OBSTETRICS & GYNECOLOGY

## 2023-11-20 PROCEDURE — 302790 HCHG STAT ANTEPARTUM CARE, DAILY

## 2023-11-20 PROCEDURE — 770002 HCHG ROOM/CARE - OB PRIVATE (112)

## 2023-11-20 PROCEDURE — 59025 FETAL NON-STRESS TEST: CPT

## 2023-11-20 PROCEDURE — A9270 NON-COVERED ITEM OR SERVICE: HCPCS | Performed by: OBSTETRICS & GYNECOLOGY

## 2023-11-20 PROCEDURE — 700102 HCHG RX REV CODE 250 W/ 637 OVERRIDE(OP): Performed by: OBSTETRICS & GYNECOLOGY

## 2023-11-20 RX ADMIN — DOCUSATE SODIUM 100 MG: 100 CAPSULE, LIQUID FILLED ORAL at 09:01

## 2023-11-20 RX ADMIN — INDOMETHACIN 50 MG: 50 CAPSULE ORAL at 03:02

## 2023-11-20 RX ADMIN — PRENATAL WITH FERROUS FUM AND FOLIC ACID 1 TABLET: 3080; 920; 120; 400; 22; 1.84; 3; 20; 10; 1; 12; 200; 27; 25; 2 TABLET ORAL at 09:00

## 2023-11-20 ASSESSMENT — PATIENT HEALTH QUESTIONNAIRE - PHQ9
1. LITTLE INTEREST OR PLEASURE IN DOING THINGS: NOT AT ALL
SUM OF ALL RESPONSES TO PHQ9 QUESTIONS 1 AND 2: 0
2. FEELING DOWN, DEPRESSED, IRRITABLE, OR HOPELESS: NOT AT ALL

## 2023-11-20 NOTE — PROGRESS NOTES
0800 - Pt received from Maddison ERAZO RN  Pt stable with no complaints at this time.      1000 - Order received for Bedside commode.     1715 - Pt had a bowel movement and called RN into room.  Some pink tinged mucous noted on the tissue.  RN instructed patient to notify if there was more or a change in the mucous.  Pt verbalized understanding.     1745 - Pt remains comfortable with no c/o cramping or contractions.  Pt states +fm.      1855 - Report given to Maddison ERAZO RN

## 2023-11-20 NOTE — CARE PLAN
The patient is Watcher - Medium risk of patient condition declining or worsening    Shift Goals  Clinical Goals: Stay pregnant and BRP  Patient Goals: Stay pregnant  Family Goals: Family support    Progress made toward(s) clinical / shift goals:  [place] catheter has been removed.  Activity has increased to BRP with bedside commode.     Patient is not progressing towards the following goals:

## 2023-11-20 NOTE — CARE PLAN
The patient is Stable - Low risk of patient condition declining or worsening    Shift Goals  Clinical Goals: Remain acontractile with uterine contractions. Remain pregnancy.  Patient Goals: Remain comfortable and sleep.  Family Goals: Support    Progress made toward(s) clinical / shift goals:    Problem: Psychosocial - L&D  Goal: Spiritual and cultural needs incorporated into hospitalization  Outcome: Progressing     Problem: Pain  Goal: Patient's pain will be alleviated or reduced to the patient’s comfort goal  Outcome: Progressing     Problem: Risk for Injury  Goal: Patient and fetus will be free of preventable injury/complications  Outcome: Progressing       Patient is not progressing towards the following goals: N/A.

## 2023-11-20 NOTE — DISCHARGE PLANNING
Discharge Planning Assessment Ante Partum    Reason for Referral: Chart review  Address: [facility] [place] [address]  Phone: [phone]  Type of Living Situation: living with FOB  Mom Diagnosis: Pregnancy-25.5 weeks, transfer from [facility] for  labor  Primary Language: English    Name of Baby: Baby Boy   Father of the Baby: Manpreet Breen   Involved in baby’s care? Yes  Contact Information: [phone]    Prenatal Care: Yes  Mom's PCP: No PCP listed   Support System: FOB    Mom's Insurance:    Mother Employed/School: [employer]   Other children in the home/names & ages: first baby    CPS History: No  Psychiatric History: No  Domestic Violence History: No  Drug/ETOH History: No    Ongoing Plan: Continue to follow and assist as needed

## 2023-11-21 LAB
MAGNESIUM SERPL-MCNC: 1.7 MG/DL (ref 1.5–2.5)
MAGNESIUM SERPL-MCNC: 4.2 MG/DL (ref 1.5–2.5)

## 2023-11-21 PROCEDURE — A9270 NON-COVERED ITEM OR SERVICE: HCPCS | Performed by: OBSTETRICS & GYNECOLOGY

## 2023-11-21 PROCEDURE — 59025 FETAL NON-STRESS TEST: CPT

## 2023-11-21 PROCEDURE — 99231 SBSQ HOSP IP/OBS SF/LOW 25: CPT | Performed by: OBSTETRICS & GYNECOLOGY

## 2023-11-21 PROCEDURE — 770002 HCHG ROOM/CARE - OB PRIVATE (112)

## 2023-11-21 PROCEDURE — 83735 ASSAY OF MAGNESIUM: CPT

## 2023-11-21 PROCEDURE — 700102 HCHG RX REV CODE 250 W/ 637 OVERRIDE(OP): Performed by: OBSTETRICS & GYNECOLOGY

## 2023-11-21 PROCEDURE — 700111 HCHG RX REV CODE 636 W/ 250 OVERRIDE (IP): Mod: JZ | Performed by: OBSTETRICS & GYNECOLOGY

## 2023-11-21 PROCEDURE — 700102 HCHG RX REV CODE 250 W/ 637 OVERRIDE(OP)

## 2023-11-21 PROCEDURE — 700105 HCHG RX REV CODE 258: Performed by: OBSTETRICS & GYNECOLOGY

## 2023-11-21 PROCEDURE — 36415 COLL VENOUS BLD VENIPUNCTURE: CPT

## 2023-11-21 PROCEDURE — 302790 HCHG STAT ANTEPARTUM CARE, DAILY

## 2023-11-21 PROCEDURE — A9270 NON-COVERED ITEM OR SERVICE: HCPCS

## 2023-11-21 RX ORDER — NIFEDIPINE 10 MG/1
10 CAPSULE ORAL
Status: DISCONTINUED | OUTPATIENT
Start: 2023-11-21 | End: 2023-12-01 | Stop reason: HOSPADM

## 2023-11-21 RX ORDER — NIFEDIPINE 10 MG/1
CAPSULE ORAL
Status: COMPLETED
Start: 2023-11-21 | End: 2023-11-21

## 2023-11-21 RX ORDER — MAGNESIUM SULFATE HEPTAHYDRATE 40 MG/ML
4 INJECTION, SOLUTION INTRAVENOUS ONCE
Status: COMPLETED | OUTPATIENT
Start: 2023-11-21 | End: 2023-11-21

## 2023-11-21 RX ORDER — MAGNESIUM SULFATE HEPTAHYDRATE 40 MG/ML
INJECTION, SOLUTION INTRAVENOUS
Status: DISCONTINUED
Start: 2023-11-21 | End: 2023-11-21

## 2023-11-21 RX ORDER — MAGNESIUM SULFATE HEPTAHYDRATE 40 MG/ML
2 INJECTION, SOLUTION INTRAVENOUS CONTINUOUS
Status: DISCONTINUED | OUTPATIENT
Start: 2023-11-21 | End: 2023-11-21

## 2023-11-21 RX ORDER — CALCIUM GLUCONATE 94 MG/ML
1 INJECTION, SOLUTION INTRAVENOUS
Status: DISCONTINUED | OUTPATIENT
Start: 2023-11-21 | End: 2023-11-21

## 2023-11-21 RX ORDER — SODIUM CHLORIDE, SODIUM LACTATE, POTASSIUM CHLORIDE, CALCIUM CHLORIDE 600; 310; 30; 20 MG/100ML; MG/100ML; MG/100ML; MG/100ML
INJECTION, SOLUTION INTRAVENOUS CONTINUOUS
Status: DISCONTINUED | OUTPATIENT
Start: 2023-11-21 | End: 2023-11-28

## 2023-11-21 RX ORDER — NIFEDIPINE 10 MG/1
10 CAPSULE ORAL EVERY 8 HOURS
Status: DISCONTINUED | OUTPATIENT
Start: 2023-11-21 | End: 2023-11-21

## 2023-11-21 RX ADMIN — SODIUM CHLORIDE, POTASSIUM CHLORIDE, SODIUM LACTATE AND CALCIUM CHLORIDE: 600; 310; 30; 20 INJECTION, SOLUTION INTRAVENOUS at 08:33

## 2023-11-21 RX ADMIN — MAGNESIUM SULFATE HEPTAHYDRATE 4 G: 4 INJECTION, SOLUTION INTRAVENOUS at 08:37

## 2023-11-21 RX ADMIN — NIFEDIPINE 10 MG: 10 CAPSULE ORAL at 08:27

## 2023-11-21 RX ADMIN — PRENATAL WITH FERROUS FUM AND FOLIC ACID 1 TABLET: 3080; 920; 120; 400; 22; 1.84; 3; 20; 10; 1; 12; 200; 27; 25; 2 TABLET ORAL at 08:27

## 2023-11-21 RX ADMIN — MAGNESIUM SULFATE HEPTAHYDRATE 2 G/HR: 40 INJECTION, SOLUTION INTRAVENOUS at 08:55

## 2023-11-21 ASSESSMENT — PATIENT HEALTH QUESTIONNAIRE - PHQ9
SUM OF ALL RESPONSES TO PHQ9 QUESTIONS 1 AND 2: 0
1. LITTLE INTEREST OR PLEASURE IN DOING THINGS: NOT AT ALL
2. FEELING DOWN, DEPRESSED, IRRITABLE, OR HOPELESS: NOT AT ALL

## 2023-11-21 NOTE — CARE PLAN
Problem: Pain  Goal: Patient's pain will be alleviated or reduced to the patient’s comfort goal  11/21/2023 1441 by Abby Pino RYadyN.  Outcome: Progressing  Note: Pt feeling painful UCs and cramping this AM. Mag and Nifedipine given per MD order. Pain resolved with interventions. Pain monitored throughout shift. Pt able to sleep and rest comfortably.   11/21/2023 1327 by Abby Pino RROCCO.  Outcome: Progressing  Note: Pt feeling increased pain and contractions this AM. Interventions in place, pain better after Mag and Nifedipine. Pain monitored throughout shift .     Problem: Risk for Infection and Impaired Wound Healing  Goal: Patient will remain free from infection  Outcome: Progressing  Note: Pt monitored for s/s of infection. None at this time.      The patient is Stable - Low risk of patient condition declining or worsening    Shift Goals  Clinical Goals: reduce cramping and UC pain  Patient Goals: releive pain, stay pregnant  Family Goals: Family support    Progress made toward(s) clinical / shift goals: Pain better and pt able to rest comfortably.     Patient is not progressing towards the following goals: N/A

## 2023-11-21 NOTE — PROGRESS NOTES
0700 - Report received from Maddison ROJAS. Care assumed.   0720 - Pt called out requesting RN look at vaginal discharge, blood tinged noted. Pt states since waking this morning she has felt cramping and lower back pain but not feeling any UCs. Fetal monitors placed.  0748 - [place] kept on for irritability and worsening cramping felt by pt.   0818 - Dr. Christian updated on discharge, pt's increased discomfort of abdominal cramping and back pain. Pt felt painful UC at this time. [place] reviewed by MD. Orders received to administer 10mg of Nifedipine IR and give 4g bolus, 2g/hr.   0900 - Pt still feeling pain rating about 5/10.   0946 - Pt states cramping resolving and pain improving. Dr. Christian at bedside. SVE unchanged and still 3cm, thick, posterior. Pt to remain on Mag and clear liquids at this time.   1215 - Pt resting comfortably. Denies any more cramping, contractions, and back pain. RN spoke with Dr. Christian. Per MD we will reassess at 1500 and then turn off Mag if pt still free from pain/UCs/ cramping.   1530 - Pt states she has not felt any cramping or back pain. Pt states she felt two UCs in the last hour that were mild. RN updated Gino. Orders received to stop Mag, increase diet, pull wang, pt okay to get to bedside commode, and Q shift monitoring. Pt understands to let us know if there are any changes or pain. Fob and pt comfortable with plan, all questions answered.   1800 - Pt comfortable, no changes or discomfort.   1900 - Report to Selena ROJAS.

## 2023-11-21 NOTE — PROGRESS NOTES
MFM CONSULTATION:  S: No complaints.  O: Patient Vitals for the past 24 hrs:   BP Temp Temp src Pulse Resp SpO2   11/20/23 1700 (!) 87/55 -- -- (!) 102 -- --   11/20/23 1600 (!) 89/50 36.2 °C (97.1 °F) Temporal -- 16 --   11/20/23 1300 96/53 -- -- 63 -- --   11/20/23 1200 96/53 36.1 °C (97 °F) Temporal 63 18 --   11/20/23 0830 106/55 -- -- 60 -- --   11/20/23 0800 106/55 36 °C (96.8 °F) Temporal -- 16 --   11/20/23 0300 102/51 36.2 °C (97.1 °F) Temporal 69 16 --   11/20/23 0009 100/59 36.1 °C (97 °F) Temporal (!) 59 18 --   11/19/23 1955 109/53 36.6 °C (97.9 °F) Temporal 77 18 96 %     EFM: Moderate variability and accelerations present. No contractions.  A: IUP 25 5/7 weeks       Cervical funneling and dilatation, stable  P:  Continue with expectant mangement.    Low complexity follow up visit.

## 2023-11-21 NOTE — CARE PLAN
The patient is Stable - Low risk of patient condition declining or worsening    Shift Goals  Clinical Goals: Remain acontractile with uterine contractions. Remain pregnancy.  Patient Goals: Remain comfortable and sleep.  Family Goals: Family support    Progress made toward(s) clinical / shift goals:    Problem: Psychosocial - L&D  Goal: Patient's level of anxiety will decrease  Outcome: Progressing  Goal: Patient will be able to discuss coping skills during hospitalization  Outcome: Progressing  Goal: Spiritual and cultural needs incorporated into hospitalization  Outcome: Progressing     Problem: Skin Integrity  Goal: Skin integrity is maintained or improved  Outcome: Progressing     Problem: Pain  Goal: Patient's pain will be alleviated or reduced to the patient’s comfort goal  Outcome: Progressing       Patient is not progressing towards the following goals: N/A.

## 2023-11-22 LAB
APPEARANCE UR: ABNORMAL
APPEARANCE UR: ABNORMAL
BACTERIA #/AREA URNS HPF: ABNORMAL /HPF
BILIRUB UR QL STRIP.AUTO: NEGATIVE
COLOR UR AUTO: ABNORMAL
COLOR UR: YELLOW
EPI CELLS #/AREA URNS HPF: ABNORMAL /HPF
GLUCOSE UR QL STRIP.AUTO: NEGATIVE MG/DL
GLUCOSE UR STRIP.AUTO-MCNC: NEGATIVE MG/DL
HYALINE CASTS #/AREA URNS LPF: ABNORMAL /LPF
KETONES UR QL STRIP.AUTO: NEGATIVE MG/DL
KETONES UR STRIP.AUTO-MCNC: NEGATIVE MG/DL
LEUKOCYTE ESTERASE UR QL STRIP.AUTO: ABNORMAL
LEUKOCYTE ESTERASE UR QL STRIP.AUTO: ABNORMAL
MICRO URNS: ABNORMAL
NITRITE UR QL STRIP.AUTO: NEGATIVE
NITRITE UR QL STRIP.AUTO: NEGATIVE
PH UR STRIP.AUTO: 7 [PH] (ref 5–8)
PH UR STRIP.AUTO: 7 [PH] (ref 5–8)
PROT UR QL STRIP: NEGATIVE MG/DL
PROT UR QL STRIP: NEGATIVE MG/DL
RBC # URNS HPF: ABNORMAL /HPF
RBC UR QL AUTO: ABNORMAL
RBC UR QL AUTO: ABNORMAL
SP GR UR STRIP.AUTO: 1.01
SP GR UR STRIP.AUTO: 1.02 (ref 1–1.03)
UROBILINOGEN UR STRIP.AUTO-MCNC: 0.2 MG/DL
WBC #/AREA URNS HPF: ABNORMAL /HPF

## 2023-11-22 PROCEDURE — A9270 NON-COVERED ITEM OR SERVICE: HCPCS | Performed by: STUDENT IN AN ORGANIZED HEALTH CARE EDUCATION/TRAINING PROGRAM

## 2023-11-22 PROCEDURE — 770002 HCHG ROOM/CARE - OB PRIVATE (112)

## 2023-11-22 PROCEDURE — 87077 CULTURE AEROBIC IDENTIFY: CPT

## 2023-11-22 PROCEDURE — 81001 URINALYSIS AUTO W/SCOPE: CPT

## 2023-11-22 PROCEDURE — A9270 NON-COVERED ITEM OR SERVICE: HCPCS | Performed by: OBSTETRICS & GYNECOLOGY

## 2023-11-22 PROCEDURE — 700102 HCHG RX REV CODE 250 W/ 637 OVERRIDE(OP): Performed by: STUDENT IN AN ORGANIZED HEALTH CARE EDUCATION/TRAINING PROGRAM

## 2023-11-22 PROCEDURE — 87086 URINE CULTURE/COLONY COUNT: CPT

## 2023-11-22 PROCEDURE — 700102 HCHG RX REV CODE 250 W/ 637 OVERRIDE(OP): Performed by: OBSTETRICS & GYNECOLOGY

## 2023-11-22 PROCEDURE — 87186 SC STD MICRODIL/AGAR DIL: CPT

## 2023-11-22 PROCEDURE — 81002 URINALYSIS NONAUTO W/O SCOPE: CPT

## 2023-11-22 PROCEDURE — 302790 HCHG STAT ANTEPARTUM CARE, DAILY

## 2023-11-22 RX ORDER — PHENAZOPYRIDINE HYDROCHLORIDE 200 MG/1
200 TABLET, FILM COATED ORAL
Status: DISCONTINUED | OUTPATIENT
Start: 2023-11-22 | End: 2023-11-26

## 2023-11-22 RX ADMIN — PHENAZOPYRIDINE HYDROCHLORIDE 200 MG: 200 TABLET ORAL at 02:36

## 2023-11-22 RX ADMIN — PRENATAL WITH FERROUS FUM AND FOLIC ACID 1 TABLET: 3080; 920; 120; 400; 22; 1.84; 3; 20; 10; 1; 12; 200; 27; 25; 2 TABLET ORAL at 07:31

## 2023-11-22 RX ADMIN — ACETAMINOPHEN 650 MG: 325 TABLET, FILM COATED ORAL at 02:02

## 2023-11-22 RX ADMIN — PHENAZOPYRIDINE HYDROCHLORIDE 200 MG: 200 TABLET ORAL at 12:32

## 2023-11-22 RX ADMIN — PHENAZOPYRIDINE HYDROCHLORIDE 200 MG: 200 TABLET ORAL at 17:24

## 2023-11-22 RX ADMIN — PHENAZOPYRIDINE HYDROCHLORIDE 200 MG: 200 TABLET ORAL at 07:30

## 2023-11-22 ASSESSMENT — PAIN DESCRIPTION - PAIN TYPE
TYPE: ACUTE PAIN
TYPE: ACUTE PAIN

## 2023-11-22 ASSESSMENT — PATIENT HEALTH QUESTIONNAIRE - PHQ9
2. FEELING DOWN, DEPRESSED, IRRITABLE, OR HOPELESS: NOT AT ALL
1. LITTLE INTEREST OR PLEASURE IN DOING THINGS: NOT AT ALL
SUM OF ALL RESPONSES TO PHQ9 QUESTIONS 1 AND 2: 0

## 2023-11-22 NOTE — PROGRESS NOTES
1900: Report received from Abby ERAZO RN; POC and orders reviewed.      0036: pt called stating that she thinks she has a UTI.  At bedside, pt describes having urgency, frequency( I've been to the restroom already about 4 times) and mild burning upon urinating.  Pt was instructed to leave a urine sample for testing.    0100: Urine collected and sent for UA and culture.  Dr Christian notified.    0239: cranberry juice and pyridium given for symptom relief.    0600: pt states the pyridium did help, and she felt some relief.    0700: Report to Tiara DAVIS RN   Begin taking fluconazole 150mg 2 tablets one day and then repeat the 2 tablets one week later. Continue with the ketoconazole shampoo and cream. You can increase your cream to twice daily but continue with the shampoo every 2-3 days. Message us a couple weeks after finishing your fluconazole if the rash does not go away and the BPO wash can be prescribed for you at that time.

## 2023-11-22 NOTE — PROGRESS NOTES
MFM CONSULTATION  S: No complaints.  Denies contractions.  O: Patient Vitals for the past 24 hrs:   BP Temp Temp src Pulse Resp SpO2   11/21/23 1500 96/54 -- -- 87 -- 96 %   11/21/23 1430 -- -- -- 87 -- 96 %   11/21/23 1415 -- -- -- 83 -- 92 %   11/21/23 1400 92/55 -- -- 83 -- 95 %   11/21/23 1345 -- -- -- 88 -- 94 %   11/21/23 1330 -- -- -- 86 -- 94 %   11/21/23 1315 -- -- -- 90 -- 94 %   11/21/23 1300 99/55 -- -- 90 -- 94 %   11/21/23 1245 -- -- -- (!) 104 -- 94 %   11/21/23 1230 -- -- -- 91 -- 94 %   11/21/23 1215 99/54 -- -- 90 16 94 %   11/21/23 1200 97/53 -- -- 97 -- 96 %   11/21/23 1145 -- -- -- 86 -- 95 %   11/21/23 1130 -- -- -- 83 -- 96 %   11/21/23 1115 -- -- -- 88 -- 97 %   11/21/23 1111 103/55 -- -- 89 -- --   11/21/23 1100 101/56 -- -- 92 -- 97 %   11/21/23 1045 -- -- -- 94 -- 96 %   11/21/23 1030 -- -- -- 81 -- 97 %   11/21/23 1015 -- -- -- 100 -- 96 %   11/21/23 1000 -- -- -- 97 -- 96 %   11/21/23 0956 97/53 -- -- 93 18 --   11/21/23 0945 106/59 -- -- (!) 105 -- 97 %   11/21/23 0930 101/56 -- -- 95 -- 97 %   11/21/23 0915 95/52 -- -- 100 -- 97 %   11/21/23 0900 103/58 -- -- 90 -- 97 %   11/21/23 0845 104/59 -- -- 80 -- 96 %   11/21/23 0735 109/65 35.9 °C (96.7 °F) Temporal 80 16 --   11/21/23 0400 104/58 36.1 °C (97 °F) Temporal 71 16 --   11/21/23 0000 105/59 36.1 °C (97 °F) Temporal 81 16 --   11/20/23 2000 116/62 36.3 °C (97.3 °F) Temporal 90 16 --     EFM: Moderate variability and accelerations present.  Single variable deceleration.  A: IUP 25 6/7 weeks      Cervical funneling and dilatation.  P: Continue with expectant management.    Low complexity MDM, follow up hospital visit.

## 2023-11-22 NOTE — CARE PLAN
The patient is Watcher - Medium risk of patient condition declining or worsening    Shift Goals  Clinical Goals: diminish uterine activity  Patient Goals: rest and stay pregnant  Family Goals: support    Progress made toward(s) clinical / shift goals:        Problem: Knowledge Deficit - L&D  Goal: Patient and family/caregivers will demonstrate understanding of plan of care, disease process/condition, diagnostic tests and medications  Outcome: Progressing     Problem: Risk for Excess Fluid Volume  Goal: Patient will demonstrate pulse, blood pressure and neurologic signs within expected ranges and without any respiratory complications  Outcome: Progressing       Patient is not progressing towards the following goals:    N/a.

## 2023-11-23 PROCEDURE — 700102 HCHG RX REV CODE 250 W/ 637 OVERRIDE(OP): Performed by: OBSTETRICS & GYNECOLOGY

## 2023-11-23 PROCEDURE — 302790 HCHG STAT ANTEPARTUM CARE, DAILY

## 2023-11-23 PROCEDURE — A9270 NON-COVERED ITEM OR SERVICE: HCPCS | Performed by: OBSTETRICS & GYNECOLOGY

## 2023-11-23 PROCEDURE — 770002 HCHG ROOM/CARE - OB PRIVATE (112)

## 2023-11-23 PROCEDURE — 99231 SBSQ HOSP IP/OBS SF/LOW 25: CPT | Performed by: OBSTETRICS & GYNECOLOGY

## 2023-11-23 RX ADMIN — PHENAZOPYRIDINE HYDROCHLORIDE 200 MG: 200 TABLET ORAL at 11:32

## 2023-11-23 RX ADMIN — PHENAZOPYRIDINE HYDROCHLORIDE 200 MG: 200 TABLET ORAL at 17:25

## 2023-11-23 RX ADMIN — PRENATAL WITH FERROUS FUM AND FOLIC ACID 1 TABLET: 3080; 920; 120; 400; 22; 1.84; 3; 20; 10; 1; 12; 200; 27; 25; 2 TABLET ORAL at 07:37

## 2023-11-23 RX ADMIN — PHENAZOPYRIDINE HYDROCHLORIDE 200 MG: 200 TABLET ORAL at 07:37

## 2023-11-23 ASSESSMENT — PAIN DESCRIPTION - PAIN TYPE
TYPE: ACUTE PAIN

## 2023-11-23 NOTE — PROGRESS NOTES
1900: Received report from CRYSTAL Lema. POC discussed. Care assumed.     0700: Report given to CRYSTAL Cade. POC discussed. Care relinquished.

## 2023-11-23 NOTE — CARE PLAN
The patient is Stable - Low risk of patient condition declining or worsening    Shift Goals  Clinical Goals: absence of ctx  Patient Goals: stay pregnant and rest  Family Goals: emotional support    Progress made toward(s) clinical / shift goals:    Problem: Knowledge Deficit - L&D  Goal: Patient and family/caregivers will demonstrate understanding of plan of care, disease process/condition, diagnostic tests and medications  Outcome: Progressing     Problem: Psychosocial - L&D  Goal: Patient's level of anxiety will decrease  Outcome: Progressing  Goal: Patient will be able to discuss coping skills during hospitalization  Outcome: Progressing       Patient is not progressing towards the following goals:

## 2023-11-23 NOTE — PROGRESS NOTES
0700: Report received from CRYSTAL Giles.   1900: Report given to CRYSTAL Henderson. POC discussed.

## 2023-11-23 NOTE — CARE PLAN
The patient is Stable - Low risk of patient condition declining or worsening    Shift Goals  Clinical Goals: absence of ctx  Patient Goals: stay pregnant and rest  Family Goals: emotional support    Progress made toward(s) clinical / shift goals:    Problem: Knowledge Deficit - L&D  Goal: Patient and family/caregivers will demonstrate understanding of plan of care, disease process/condition, diagnostic tests and medications  11/22/2023 2120 by Gaby Simon R.N.  Outcome: Progressing  11/22/2023 2106 by Gaby Simon R.N.  Outcome: Progressing     Problem: Psychosocial - L&D  Goal: Patient's level of anxiety will decrease  11/22/2023 2120 by Gaby Simon R.N.  Outcome: Progressing  11/22/2023 2106 by Gaby Simon R.N.  Outcome: Progressing  Goal: Patient will be able to discuss coping skills during hospitalization  11/22/2023 2120 by Gaby Simon R.N.  Outcome: Progressing  11/22/2023 2106 by Gaby Simon R.N.  Outcome: Progressing       Patient is not progressing towards the following goals:

## 2023-11-23 NOTE — PROGRESS NOTES
MFM CONSULTATION:  S: No complaints.  O: Patient Vitals for the past 24 hrs:   BP Temp Temp src Pulse Resp SpO2   11/23/23 0801 108/58 36.4 °C (97.5 °F) Temporal 96 16 --   11/23/23 0400 94/50 36.3 °C (97.4 °F) Temporal 75 16 --   11/22/23 2350 110/56 36.6 °C (97.8 °F) Temporal (!) 107 17 --   11/22/23 2000 97/56 -- -- (!) 111 -- 94 %   11/22/23 1607 107/62 36.5 °C (97.7 °F) Temporal (!) 112 18 --   11/22/23 1145 107/59 36.2 °C (97.1 °F) Temporal 95 16 --     EFM: Pending.  Yesterday, moderate variability and accelerations present.  A: IUP 26 1/7 weeks      Cervical funneling and dilatation.3 cm  P: Expectant management.          Low complexity MDM follow up visit.

## 2023-11-24 LAB
BACTERIA UR CULT: ABNORMAL
BACTERIA UR CULT: ABNORMAL
SIGNIFICANT IND 70042: ABNORMAL
SITE SITE: ABNORMAL
SOURCE SOURCE: ABNORMAL

## 2023-11-24 PROCEDURE — 99231 SBSQ HOSP IP/OBS SF/LOW 25: CPT | Performed by: OBSTETRICS & GYNECOLOGY

## 2023-11-24 PROCEDURE — A9270 NON-COVERED ITEM OR SERVICE: HCPCS | Performed by: OBSTETRICS & GYNECOLOGY

## 2023-11-24 PROCEDURE — 700102 HCHG RX REV CODE 250 W/ 637 OVERRIDE(OP): Performed by: OBSTETRICS & GYNECOLOGY

## 2023-11-24 PROCEDURE — 302790 HCHG STAT ANTEPARTUM CARE, DAILY

## 2023-11-24 PROCEDURE — 770002 HCHG ROOM/CARE - OB PRIVATE (112)

## 2023-11-24 PROCEDURE — 59025 FETAL NON-STRESS TEST: CPT

## 2023-11-24 RX ORDER — CEPHALEXIN 500 MG/1
500 CAPSULE ORAL EVERY 8 HOURS
Status: DISCONTINUED | OUTPATIENT
Start: 2023-11-24 | End: 2023-11-28 | Stop reason: HOSPADM

## 2023-11-24 RX ADMIN — CEPHALEXIN 500 MG: 500 CAPSULE ORAL at 16:00

## 2023-11-24 RX ADMIN — PHENAZOPYRIDINE HYDROCHLORIDE 200 MG: 200 TABLET ORAL at 08:31

## 2023-11-24 RX ADMIN — CEPHALEXIN 500 MG: 500 CAPSULE ORAL at 08:37

## 2023-11-24 RX ADMIN — PRENATAL WITH FERROUS FUM AND FOLIC ACID 1 TABLET: 3080; 920; 120; 400; 22; 1.84; 3; 20; 10; 1; 12; 200; 27; 25; 2 TABLET ORAL at 08:31

## 2023-11-24 RX ADMIN — CEPHALEXIN 500 MG: 500 CAPSULE ORAL at 22:23

## 2023-11-24 RX ADMIN — PHENAZOPYRIDINE HYDROCHLORIDE 200 MG: 200 TABLET ORAL at 18:24

## 2023-11-24 RX ADMIN — PHENAZOPYRIDINE HYDROCHLORIDE 200 MG: 200 TABLET ORAL at 14:06

## 2023-11-24 ASSESSMENT — PATIENT HEALTH QUESTIONNAIRE - PHQ9
SUM OF ALL RESPONSES TO PHQ9 QUESTIONS 1 AND 2: 0
1. LITTLE INTEREST OR PLEASURE IN DOING THINGS: NOT AT ALL

## 2023-11-24 NOTE — PROGRESS NOTES
MFM CONSULTATION:  S: No complaints.      Pt started on Keflex for UTI (E coli) pending sensitivity.  O: Patient Vitals for the past 24 hrs:   BP Temp Temp src Pulse Resp SpO2   11/24/23 0830 109/60 36.8 °C (98.2 °F) Temporal 97 16 --   11/24/23 0406 107/57 36.3 °C (97.4 °F) Temporal 83 16 --   11/23/23 2330 125/67 -- -- 100 20 --   11/23/23 1931 118/63 36.6 °C (97.8 °F) Temporal (!) 111 18 94 %   11/23/23 1930 -- -- -- -- -- 94 %   11/23/23 1616 113/58 36.7 °C (98.1 °F) Temporal (!) 101 16 --   11/23/23 1237 111/55 36.7 °C (98 °F) Temporal (!) 108 17 --     EFM: Moderate variability with single variable deceleration.            No contractions.  A: IUP 26 2/7 weeks      Cervical funneling and dilatation, 3 cm.      UTI E. Coli, sensitivity pending.  P  Await sensitivity report.       Expectant management.    Low complexity follow up hospital visit.

## 2023-11-24 NOTE — PROGRESS NOTES
6915 Report received from Beatriz ROJAS.  4291 Patient resting comfortably, denies UCs, +FM.  0952 Dr. Man called regarding variable deceleration, orders received for continuous FM.   1417 Dr. Man updated by this RN on FHTs. Orders received to discontinue continuous FM at this time. Patient felt slight cramping one hour ago but denies feeling contractions at this time. + FM. Patient rests comfortably with support at side.  Patient denies Ucs and pain.   1900 Report given to NOC RN.

## 2023-11-24 NOTE — CARE PLAN
The patient is Watcher - Medium risk of patient condition declining or worsening    Shift Goals  Clinical Goals: No s/s of  labor  Patient Goals: Stay pregnant  Family Goals: Support    Progress made toward(s) clinical / shift goals:    Problem: Skin Integrity  Goal: Skin integrity is maintained or improved  Outcome: Progressing     Problem: Pain  Goal: Patient's pain will be alleviated or reduced to the patient’s comfort goal  Outcome: Progressing     Problem: Risk for Infection and Impaired Wound Healing  Goal: Patient will remain free from infection  Outcome: Progressing     Problem: Risk for Injury  Goal: Patient and fetus will be free of preventable injury/complications  Outcome: Progressing

## 2023-11-24 NOTE — CARE PLAN
The patient is Stable - Low risk of patient condition declining or worsening    Shift Goals  Clinical Goals: Monitor for s/s  labor onset  Patient Goals: Remain pregnant, rest  Family Goals: Support    Progress made toward(s) clinical / shift goals:    Problem: Knowledge Deficit - L&D  Goal: Patient and family/caregivers will demonstrate understanding of plan of care, disease process/condition, diagnostic tests and medications  Outcome: Progressing     Problem: Psychosocial - L&D  Goal: Patient's level of anxiety will decrease  Outcome: Progressing  Pt's SO at bedside for support. Pt has positive outlook on situation and demonstrating appropriate coping skills to manage anxiety regarding long hospital admission.     Problem: Pain  Goal: Patient's pain will be alleviated or reduced to the patient’s comfort goal  Outcome: Progressing  Pt reports no contractions this shift. Pt states that Pyridium has relieved pain with urination to manageable level.     Patient is not progressing towards the following goals: NA

## 2023-11-24 NOTE — PROGRESS NOTES
1945- Dr. Christian updated on positive UA culture. He will reports he will review the results  2330- Pt's spouse laying in bed with pt. Pt educated that cuddling bed with spouse can increase uterine contractions. Pt verbalized understanding and spouse will sleep on the couch bed  0130- Pt asleep. RR WNL. Dad is playing video games at the computer  0400- Raegan ROJAS into do morning vitals  0700- Report to Flo ROJAS

## 2023-11-25 PROCEDURE — 700102 HCHG RX REV CODE 250 W/ 637 OVERRIDE(OP): Performed by: OBSTETRICS & GYNECOLOGY

## 2023-11-25 PROCEDURE — A9270 NON-COVERED ITEM OR SERVICE: HCPCS | Performed by: OBSTETRICS & GYNECOLOGY

## 2023-11-25 PROCEDURE — 770002 HCHG ROOM/CARE - OB PRIVATE (112)

## 2023-11-25 PROCEDURE — 302790 HCHG STAT ANTEPARTUM CARE, DAILY

## 2023-11-25 PROCEDURE — 99231 SBSQ HOSP IP/OBS SF/LOW 25: CPT | Performed by: OBSTETRICS & GYNECOLOGY

## 2023-11-25 PROCEDURE — 59025 FETAL NON-STRESS TEST: CPT

## 2023-11-25 RX ADMIN — CEPHALEXIN 500 MG: 500 CAPSULE ORAL at 06:17

## 2023-11-25 RX ADMIN — PHENAZOPYRIDINE HYDROCHLORIDE 200 MG: 200 TABLET ORAL at 08:20

## 2023-11-25 RX ADMIN — CEPHALEXIN 500 MG: 500 CAPSULE ORAL at 21:59

## 2023-11-25 RX ADMIN — PHENAZOPYRIDINE HYDROCHLORIDE 200 MG: 200 TABLET ORAL at 12:36

## 2023-11-25 RX ADMIN — CEPHALEXIN 500 MG: 500 CAPSULE ORAL at 15:01

## 2023-11-25 RX ADMIN — PRENATAL WITH FERROUS FUM AND FOLIC ACID 1 TABLET: 3080; 920; 120; 400; 22; 1.84; 3; 20; 10; 1; 12; 200; 27; 25; 2 TABLET ORAL at 08:20

## 2023-11-25 RX ADMIN — PHENAZOPYRIDINE HYDROCHLORIDE 200 MG: 200 TABLET ORAL at 17:13

## 2023-11-25 NOTE — PROGRESS NOTES
MFM CONSULTATION:  S: No bleeding cramping.  Mild dysuria.  O: Patient Vitals for the past 24 hrs:   BP Temp Temp src Pulse Resp SpO2   11/25/23 0400 99/51 36.6 °C (97.8 °F) Temporal 84 16 --   11/25/23 0000 118/62 36.1 °C (96.9 °F) Temporal (!) 105 16 --   11/24/23 1930 112/69 36.7 °C (98 °F) Temporal (!) 117 17 --   11/24/23 1612 104/56 36.8 °C (98.2 °F) Temporal 92 17 92 %   11/24/23 1231 117/59 36.5 °C (97.7 °F) Temporal 96 17 --   11/24/23 0830 109/60 36.8 °C (98.2 °F) Temporal 97 16 --     EFM: Moderate variability and accelerations present.  Occasional mild variables yesterday, resolved.    URINE CULTURE: E. Coli, sensitive to cephalosporin.    A: IUP 26 3/7 weeks      Cervical funneling and dilatation.      Breech      UTI on Keflex  P: Continue with keflex      Expectant management.    Low complexity hospital follow up visit.

## 2023-11-25 NOTE — CARE PLAN
The patient is Watcher - Medium risk of patient condition declining or worsening    Shift Goals  Clinical Goals: Rest, Monitor for UC's  Patient Goals: Rest  Family Goals: Support    Progress made toward(s) clinical / shift goals:        Problem: Knowledge Deficit - L&D  Goal: Patient and family/caregivers will demonstrate understanding of plan of care, disease process/condition, diagnostic tests and medications  Outcome: Progressing     Problem: Pain  Goal: Patient's pain will be alleviated or reduced to the patient’s comfort goal  Outcome: Progressing

## 2023-11-25 NOTE — PROGRESS NOTES
1900: Received report from SARA White RN. PC discussed.    2106: VD reported to Dr. Man, jhoan seen by provider. No further monitoring at this time.     0700: Report given to CRYSTAL Aggarwal. POC discussed.

## 2023-11-25 NOTE — PROGRESS NOTES
0700: Received report from from Ivon ROJAS, plan of care discussed.    0815: Dr. aMn at bedside to check in on pt and discuss plan of care. RN at bedside, Call light in reach pt encouraged to use when in need of assistance. Pt states feeling a contraction occasionally, mild cramping in lower abdomen. Pt understands to notify RN if ctx increase in intensity or become more frequent.     1900: Report given to Jens ROJAS, plan of care discussed.

## 2023-11-26 PROBLEM — O34.32: Status: ACTIVE | Noted: 2023-11-26

## 2023-11-26 PROBLEM — B95.1 POSITIVE GBS TEST: Status: ACTIVE | Noted: 2023-11-26

## 2023-11-26 PROBLEM — O23.42 URINARY TRACT INFECTION AFFECTING CARE OF MOTHER IN SECOND TRIMESTER, ANTEPARTUM: Status: ACTIVE | Noted: 2023-11-26

## 2023-11-26 PROCEDURE — A9270 NON-COVERED ITEM OR SERVICE: HCPCS | Performed by: OBSTETRICS & GYNECOLOGY

## 2023-11-26 PROCEDURE — 700102 HCHG RX REV CODE 250 W/ 637 OVERRIDE(OP): Performed by: OBSTETRICS & GYNECOLOGY

## 2023-11-26 PROCEDURE — 302790 HCHG STAT ANTEPARTUM CARE, DAILY

## 2023-11-26 PROCEDURE — 770002 HCHG ROOM/CARE - OB PRIVATE (112)

## 2023-11-26 PROCEDURE — 59025 FETAL NON-STRESS TEST: CPT

## 2023-11-26 PROCEDURE — 99231 SBSQ HOSP IP/OBS SF/LOW 25: CPT | Performed by: OBSTETRICS & GYNECOLOGY

## 2023-11-26 RX ORDER — PHENAZOPYRIDINE HYDROCHLORIDE 200 MG/1
200 TABLET, FILM COATED ORAL 3 TIMES DAILY PRN
Status: DISCONTINUED | OUTPATIENT
Start: 2023-11-26 | End: 2023-11-28 | Stop reason: HOSPADM

## 2023-11-26 RX ORDER — INDOMETHACIN 50 MG/1
50 CAPSULE ORAL ONCE
Status: COMPLETED | OUTPATIENT
Start: 2023-11-27 | End: 2023-11-27

## 2023-11-26 RX ADMIN — PRENATAL WITH FERROUS FUM AND FOLIC ACID 1 TABLET: 3080; 920; 120; 400; 22; 1.84; 3; 20; 10; 1; 12; 200; 27; 25; 2 TABLET ORAL at 09:31

## 2023-11-26 RX ADMIN — CEPHALEXIN 500 MG: 500 CAPSULE ORAL at 06:05

## 2023-11-26 RX ADMIN — CEPHALEXIN 500 MG: 500 CAPSULE ORAL at 14:28

## 2023-11-26 RX ADMIN — CEPHALEXIN 500 MG: 500 CAPSULE ORAL at 22:06

## 2023-11-26 RX ADMIN — PHENAZOPYRIDINE HYDROCHLORIDE 200 MG: 200 TABLET ORAL at 09:31

## 2023-11-26 NOTE — CARE PLAN
Problem: Knowledge Deficit - L&D  Goal: Patient and family/caregivers will demonstrate understanding of plan of care, disease process/condition, diagnostic tests and medications  11/25/2023 2008 by Jens Matias R.N.  Outcome: Progressing  11/25/2023 2008 by Jens Matias R.N.  Outcome: Progressing     Problem: Psychosocial - L&D  Goal: Patient's level of anxiety will decrease  11/25/2023 2008 by Jens Matias R.N.  Outcome: Progressing  11/25/2023 2008 by Jens Matias R.N.  Outcome: Progressing     Problem: Risk for Infection and Impaired Wound Healing  Goal: Patient will remain free from infection  Outcome: Progressing   The patient is Stable - Low risk of patient condition declining or worsening    Shift Goals  Clinical Goals: healthy mom, healthy baby  Patient Goals: rest  Family Goals: support

## 2023-11-26 NOTE — PROGRESS NOTES
1910: Received report from Alessia PEREZ RN.     1935: EFM and TOCO applied. Pt denies pain and no needs at this time.     2014: NST completed

## 2023-11-26 NOTE — PROGRESS NOTES
"MFM CONSULTATION:  S: Feeling some \"light cramps\" this morning.  Denies bleeding or leakage of fluid.  O: Patient Vitals for the past 24 hrs:   BP Temp Temp src Pulse Resp   11/26/23 0813 101/55 36.3 °C (97.4 °F) Temporal 90 16   11/26/23 0400 104/56 36.2 °C (97.1 °F) Temporal 94 17   11/25/23 2350 113/59 36.4 °C (97.6 °F) Temporal (!) 105 17   11/25/23 1930 113/62 36.5 °C (97.7 °F) Temporal (!) 109 17   11/25/23 1700 102/56 -- -- (!) 117 --   11/25/23 1621 101/56 36.6 °C (97.9 °F) Temporal (!) 102 18   11/25/23 1100 101/56 36.4 °C (97.5 °F) Temporal (!) 102 16   11/25/23 0900 96/51 -- -- 84 --   11/25/23 0841 96/51 36.4 °C (97.6 °F) Temporal 84 18     EFM to be started.  A: IUP 26 4.7 weeks      Cervical funneling and dilatation at 3 cm      Breech      UTI under treatment.      GBS positive  P: EFM to be reestablished.    Follow up hospital visit moderate MDM   "

## 2023-11-27 LAB
A1 MICROGLOB PLACENTAL VAG QL: NEGATIVE
BASOPHILS # BLD AUTO: 0.2 % (ref 0–1.8)
BASOPHILS # BLD: 0.03 K/UL (ref 0–0.12)
EOSINOPHIL # BLD AUTO: 0.09 K/UL (ref 0–0.51)
EOSINOPHIL NFR BLD: 0.7 % (ref 0–6.9)
ERYTHROCYTE [DISTWIDTH] IN BLOOD BY AUTOMATED COUNT: 42.4 FL (ref 35.9–50)
GLUCOSE 1H P 50 G GLC PO SERPL-MCNC: 170 MG/DL (ref 70–139)
HCT VFR BLD AUTO: 31.3 % (ref 37–47)
HGB BLD-MCNC: 10.7 G/DL (ref 12–16)
IMM GRANULOCYTES # BLD AUTO: 0.08 K/UL (ref 0–0.11)
IMM GRANULOCYTES NFR BLD AUTO: 0.6 % (ref 0–0.9)
LYMPHOCYTES # BLD AUTO: 1.93 K/UL (ref 1–4.8)
LYMPHOCYTES NFR BLD: 14.5 % (ref 22–41)
MAGNESIUM SERPL-MCNC: 3.8 MG/DL (ref 1.5–2.5)
MAGNESIUM SERPL-MCNC: 4.7 MG/DL (ref 1.5–2.5)
MCH RBC QN AUTO: 30.6 PG (ref 27–33)
MCHC RBC AUTO-ENTMCNC: 34.2 G/DL (ref 32.2–35.5)
MCV RBC AUTO: 89.4 FL (ref 81.4–97.8)
MONOCYTES # BLD AUTO: 0.47 K/UL (ref 0–0.85)
MONOCYTES NFR BLD AUTO: 3.5 % (ref 0–13.4)
NEUTROPHILS # BLD AUTO: 10.69 K/UL (ref 1.82–7.42)
NEUTROPHILS NFR BLD: 80.5 % (ref 44–72)
NRBC # BLD AUTO: 0 K/UL
NRBC BLD-RTO: 0 /100 WBC (ref 0–0.2)
PLATELET # BLD AUTO: 298 K/UL (ref 164–446)
PMV BLD AUTO: 10.2 FL (ref 9–12.9)
RBC # BLD AUTO: 3.5 M/UL (ref 4.2–5.4)
T PALLIDUM AB SER QL IA: NORMAL
WBC # BLD AUTO: 13.3 K/UL (ref 4.8–10.8)

## 2023-11-27 PROCEDURE — 700105 HCHG RX REV CODE 258: Performed by: OBSTETRICS & GYNECOLOGY

## 2023-11-27 PROCEDURE — 86780 TREPONEMA PALLIDUM: CPT

## 2023-11-27 PROCEDURE — 700102 HCHG RX REV CODE 250 W/ 637 OVERRIDE(OP): Performed by: STUDENT IN AN ORGANIZED HEALTH CARE EDUCATION/TRAINING PROGRAM

## 2023-11-27 PROCEDURE — 700102 HCHG RX REV CODE 250 W/ 637 OVERRIDE(OP): Performed by: OBSTETRICS & GYNECOLOGY

## 2023-11-27 PROCEDURE — 84112 EVAL AMNIOTIC FLUID PROTEIN: CPT

## 2023-11-27 PROCEDURE — 99999 PR NO CHARGE: CPT | Performed by: STUDENT IN AN ORGANIZED HEALTH CARE EDUCATION/TRAINING PROGRAM

## 2023-11-27 PROCEDURE — A9270 NON-COVERED ITEM OR SERVICE: HCPCS | Performed by: STUDENT IN AN ORGANIZED HEALTH CARE EDUCATION/TRAINING PROGRAM

## 2023-11-27 PROCEDURE — 302790 HCHG STAT ANTEPARTUM CARE, DAILY

## 2023-11-27 PROCEDURE — 85025 COMPLETE CBC W/AUTO DIFF WBC: CPT

## 2023-11-27 PROCEDURE — A9270 NON-COVERED ITEM OR SERVICE: HCPCS | Performed by: OBSTETRICS & GYNECOLOGY

## 2023-11-27 PROCEDURE — 82950 GLUCOSE TEST: CPT

## 2023-11-27 PROCEDURE — 770002 HCHG ROOM/CARE - OB PRIVATE (112)

## 2023-11-27 PROCEDURE — 83735 ASSAY OF MAGNESIUM: CPT | Mod: 91

## 2023-11-27 PROCEDURE — 36415 COLL VENOUS BLD VENIPUNCTURE: CPT

## 2023-11-27 PROCEDURE — 700111 HCHG RX REV CODE 636 W/ 250 OVERRIDE (IP)

## 2023-11-27 PROCEDURE — 700111 HCHG RX REV CODE 636 W/ 250 OVERRIDE (IP): Mod: JZ | Performed by: OBSTETRICS & GYNECOLOGY

## 2023-11-27 RX ORDER — MAGNESIUM SULFATE HEPTAHYDRATE 40 MG/ML
INJECTION, SOLUTION INTRAVENOUS
Status: ACTIVE
Start: 2023-11-27 | End: 2023-11-27

## 2023-11-27 RX ORDER — MAGNESIUM SULFATE HEPTAHYDRATE 40 MG/ML
INJECTION, SOLUTION INTRAVENOUS
Status: COMPLETED
Start: 2023-11-27 | End: 2023-11-27

## 2023-11-27 RX ORDER — MAGNESIUM SULFATE HEPTAHYDRATE 40 MG/ML
4 INJECTION, SOLUTION INTRAVENOUS ONCE
Status: COMPLETED | OUTPATIENT
Start: 2023-11-27 | End: 2023-11-27

## 2023-11-27 RX ORDER — CALCIUM GLUCONATE 94 MG/ML
1 INJECTION, SOLUTION INTRAVENOUS
Status: DISCONTINUED | OUTPATIENT
Start: 2023-11-27 | End: 2023-11-28 | Stop reason: HOSPADM

## 2023-11-27 RX ORDER — MAGNESIUM SULFATE HEPTAHYDRATE 40 MG/ML
2 INJECTION, SOLUTION INTRAVENOUS CONTINUOUS
Status: DISPENSED | OUTPATIENT
Start: 2023-11-27 | End: 2023-11-28

## 2023-11-27 RX ORDER — SODIUM CHLORIDE, SODIUM LACTATE, POTASSIUM CHLORIDE, CALCIUM CHLORIDE 600; 310; 30; 20 MG/100ML; MG/100ML; MG/100ML; MG/100ML
INJECTION, SOLUTION INTRAVENOUS CONTINUOUS
Status: DISCONTINUED | OUTPATIENT
Start: 2023-11-27 | End: 2023-11-28

## 2023-11-27 RX ADMIN — MAGNESIUM SULFATE HEPTAHYDRATE 4 G: 40 INJECTION, SOLUTION INTRAVENOUS at 02:02

## 2023-11-27 RX ADMIN — CEPHALEXIN 500 MG: 500 CAPSULE ORAL at 14:24

## 2023-11-27 RX ADMIN — CEPHALEXIN 500 MG: 500 CAPSULE ORAL at 06:16

## 2023-11-27 RX ADMIN — MAGNESIUM SULFATE HEPTAHYDRATE 2 G/HR: 40 INJECTION, SOLUTION INTRAVENOUS at 21:53

## 2023-11-27 RX ADMIN — MAGNESIUM SULFATE HEPTAHYDRATE 2 G/HR: 40 INJECTION, SOLUTION INTRAVENOUS at 02:32

## 2023-11-27 RX ADMIN — INDOMETHACIN 50 MG: 50 CAPSULE ORAL at 00:09

## 2023-11-27 RX ADMIN — SODIUM CHLORIDE, POTASSIUM CHLORIDE, SODIUM LACTATE AND CALCIUM CHLORIDE: 600; 310; 30; 20 INJECTION, SOLUTION INTRAVENOUS at 01:57

## 2023-11-27 RX ADMIN — CEPHALEXIN 500 MG: 500 CAPSULE ORAL at 21:49

## 2023-11-27 RX ADMIN — ACETAMINOPHEN 650 MG: 325 TABLET, FILM COATED ORAL at 16:50

## 2023-11-27 RX ADMIN — MAGNESIUM SULFATE HEPTAHYDRATE 4 G: 40 INJECTION, SOLUTION INTRAVENOUS at 02:03

## 2023-11-27 RX ADMIN — ACETAMINOPHEN 650 MG: 325 TABLET, FILM COATED ORAL at 21:49

## 2023-11-27 NOTE — PROGRESS NOTES
1900: Report received from Zaria ROJAS; POC and orders reviewed and discussed.    2212: monitors placed for evening NST.  Pt states she feels some cramping.    2318:Dr Man contacted and given update. Pt bertha irregularly but variables occurring with each uc.  Order received to give Indocin 50 mg po once. Dr Landis to be updated.     0015: Dr Delaney contacted and updated.  MD on her way to assess pt.    0028: Dr Delaney at bedside; SVE done, 3/ thick.  Bedside US shows baby vertex.  Pt states she's had a change in her discharge. Order received for amnisure.    0127: amnisure negative;  per Dr Man, start magnesium sulfate for PTL and neuro protection.  See MAR for magnesium times.    0425: Dr Delaney calling for update. Reported that pt on magnesium sulfate, no uc's detected but variables have gotten deeper.  Order received for LR bolus and US at bedside for formal LUTHER scan.

## 2023-11-27 NOTE — PROGRESS NOTES
ANTEPARTUM PROGRESS NOTE;    Beka Du is a 23 y.o. female  at 26w5d.    Patient Active Problem List    Diagnosis Date Noted    Cervical funneling affecting pregnancy, antepartum, second trimester 2023    Positive GBS test 2023    Urinary tract infection affecting care of mother in second trimester, antepartum 2023     labor in second trimester without delivery 2023    Indication for care in labor or delivery 2023         SUBJECTIVE:  Patient seen and examined. Overall, she is doing well. Resting comfortably. Per RN and Dr. Delaney she had 4 variable decelerations noted last night, which seemed to be at the time of her contractions. Dr. Man ordered a dose of indocin. Cervix was checked and 3/60/-3. A BSUS was performed and showed the baby to be vertex and LUTHER was calculated to be 7.1. MgSO4 was started for neuroprotection in light of contractions. Overall, her FHT showed moderate variability.  Denies HA, change in vision, RUQ/epigastric pain, SOB, CP, fever, nausea/vomiting, edema or calf pain.     Contractions: has not had any while sleeping  Leaking of fluid: denies  Vaginal bleeding: denies  Fetal movement: present    Review of systems; denies vaginal bleeding, leakage of fluid, uterine contractions, fever chills or abdominal pain  History reviewed. No pertinent past medical history.  No past surgical history on file.  Patient has no known allergies.  Social History     Socioeconomic History    Marital status: Single     Spouse name: Not on file    Number of children: Not on file    Years of education: Not on file    Highest education level: Not on file   Occupational History    Not on file   Tobacco Use    Smoking status: Never    Smokeless tobacco: Never   Vaping Use    Vaping Use: Never used   Substance and Sexual Activity    Alcohol use: Never    Drug use: Never    Sexual activity: Not on file   Other Topics Concern    Not on file   Social  "History Narrative    Not on file     Social Determinants of Health     Financial Resource Strain: Not on file   Food Insecurity: Not on file   Transportation Needs: Not on file   Physical Activity: Not on file   Stress: Not on file   Social Connections: Not on file   Intimate Partner Violence: Not on file   Housing Stability: Not on file         Physical examination;  BP 93/52   Pulse 88   Temp 36.3 °C (97.4 °F) (Temporal)   Resp 16   Ht 1.651 m (5' 5\")   Wt 88.9 kg (196 lb)   SpO2 94%   BMI 32.62 kg/m²   Alert and oriented x3  Gen.-well-developed well-nourished female in no apparent distress  HEENT-normocephalic, nontraumatic,EOMI  Skin is warm and dry  Cardiovascular-regular rate and rhythm  Lungs-no respiratory distress.   Abdomen-nondistended positive bowel sounds soft nontender without masses or hepatosplenomegaly  Cervix- per Dr. Jorgensen 3/60/3. This was not rechecked today.  Extremities without cyanosis clubbing or edema, no evidence of DVT  Neurologic grossly intact    Lab Results   Component Value Date/Time    WBC 13.1 (H) 2023 04:58 AM           NST-as performed and read by myself; reactive NST without contractions    Assessment  IUP AT 26w5d   Ms. Lillian Du is a 23 y.o. year old female at 26w5d admitted due to  labor with vaginal bleeding. Since her stay has not had any additional vaginal bleeding. Was dx'd with UTI on 2023 and started on Keflex. It was noted that she was having deep variables and increasing contractions around 0100. Pt received one dose of indocin and MgSO4 was initiated. Since receiving the medication her contractions have decreased. Bedside US showed vertex lie and LUTHER: 7.1. Will order her 3T labs. Offer Tdap at 27 weeks.       Plan;  1. Continue observation  2. NST daily   3. Continue Keflex due to UTI  4. Magnesium started and will discontinue after 12 hours-stop at 1300  5. CBC, 1 hour glucose tolerance, and syphilis screening ordered.     I " reviewed the case with Dmitriy Mckinnon MD [facility] who consulted and agrees with the plan.       Binta Carrizales P.A.-C.  Obstetrics and Gynecology  11/27/2023     9:05 AM

## 2023-11-27 NOTE — PROGRESS NOTES
Called by RN for assessment secondary to variable declerations. 4 variable declerations noted, and patient reports she believes this ahppened at the same time as contractions. TOCO not monitoring well. Contractions have increased tonight. Dr. Man was called first who ordered a dose of indocin.   On assessment, patient is well appearing and calm.   Cervix is 3/60/-3.   BSUS reveals fetus in vertex position with subjectively minimal fluid noted around the fetus. Patient did complain of more liquid LOF so amnisure ordered.   Plan pending amnisure results and effectiveness of indocin.     Update: MgSO4 was started by M for neuroprotection in light of contractions. Contractions have since spaced out significantly. Deep variable decelerations noted still occasionally noted but not with the same frequency as earlier. Overall FHT demonstrated moderate variability. Bedside US done for LUTHER with a value of 7.1. Encouraged PO hydration.

## 2023-11-28 ENCOUNTER — ANESTHESIA (OUTPATIENT)
Dept: OBGYN | Facility: MEDICAL CENTER | Age: 23
End: 2023-11-28
Payer: OTHER GOVERNMENT

## 2023-11-28 ENCOUNTER — ANESTHESIA EVENT (OUTPATIENT)
Dept: OBGYN | Facility: MEDICAL CENTER | Age: 23
End: 2023-11-28
Payer: OTHER GOVERNMENT

## 2023-11-28 LAB
ABO GROUP BLD: NORMAL
BLD GP AB SCN SERPL QL: NORMAL
CRYSTALS AMN MICRO: NORMAL
PATHOLOGY CONSULT NOTE: NORMAL
RH BLD: NORMAL

## 2023-11-28 PROCEDURE — 160009 HCHG ANES TIME/MIN: Performed by: OBSTETRICS & GYNECOLOGY

## 2023-11-28 PROCEDURE — 700102 HCHG RX REV CODE 250 W/ 637 OVERRIDE(OP): Performed by: OBSTETRICS & GYNECOLOGY

## 2023-11-28 PROCEDURE — 700101 HCHG RX REV CODE 250: Performed by: ANESTHESIOLOGY

## 2023-11-28 PROCEDURE — C1755 CATHETER, INTRASPINAL: HCPCS | Performed by: OBSTETRICS & GYNECOLOGY

## 2023-11-28 PROCEDURE — 700111 HCHG RX REV CODE 636 W/ 250 OVERRIDE (IP): Mod: JZ

## 2023-11-28 PROCEDURE — 36415 COLL VENOUS BLD VENIPUNCTURE: CPT

## 2023-11-28 PROCEDURE — 160002 HCHG RECOVERY MINUTES (STAT): Performed by: OBSTETRICS & GYNECOLOGY

## 2023-11-28 PROCEDURE — 160029 HCHG SURGERY MINUTES - 1ST 30 MINS LEVEL 4: Performed by: OBSTETRICS & GYNECOLOGY

## 2023-11-28 PROCEDURE — A9270 NON-COVERED ITEM OR SERVICE: HCPCS | Performed by: OBSTETRICS & GYNECOLOGY

## 2023-11-28 PROCEDURE — 700102 HCHG RX REV CODE 250 W/ 637 OVERRIDE(OP): Performed by: ANESTHESIOLOGY

## 2023-11-28 PROCEDURE — 700111 HCHG RX REV CODE 636 W/ 250 OVERRIDE (IP): Mod: JZ | Performed by: ANESTHESIOLOGY

## 2023-11-28 PROCEDURE — 160041 HCHG SURGERY MINUTES - EA ADDL 1 MIN LEVEL 4: Performed by: OBSTETRICS & GYNECOLOGY

## 2023-11-28 PROCEDURE — 700105 HCHG RX REV CODE 258: Performed by: OBSTETRICS & GYNECOLOGY

## 2023-11-28 PROCEDURE — 700105 HCHG RX REV CODE 258

## 2023-11-28 PROCEDURE — 89060 EXAM SYNOVIAL FLUID CRYSTALS: CPT

## 2023-11-28 PROCEDURE — 86850 RBC ANTIBODY SCREEN: CPT

## 2023-11-28 PROCEDURE — 86900 BLOOD TYPING SEROLOGIC ABO: CPT

## 2023-11-28 PROCEDURE — 86901 BLOOD TYPING SEROLOGIC RH(D): CPT

## 2023-11-28 PROCEDURE — C1765 ADHESION BARRIER: HCPCS | Performed by: OBSTETRICS & GYNECOLOGY

## 2023-11-28 PROCEDURE — 700111 HCHG RX REV CODE 636 W/ 250 OVERRIDE (IP): Performed by: OBSTETRICS & GYNECOLOGY

## 2023-11-28 PROCEDURE — 700105 HCHG RX REV CODE 258: Performed by: ANESTHESIOLOGY

## 2023-11-28 PROCEDURE — A9270 NON-COVERED ITEM OR SERVICE: HCPCS | Performed by: ANESTHESIOLOGY

## 2023-11-28 PROCEDURE — 160048 HCHG OR STATISTICAL LEVEL 1-5: Performed by: OBSTETRICS & GYNECOLOGY

## 2023-11-28 PROCEDURE — 770002 HCHG ROOM/CARE - OB PRIVATE (112)

## 2023-11-28 PROCEDURE — 88305 TISSUE EXAM BY PATHOLOGIST: CPT

## 2023-11-28 PROCEDURE — 700111 HCHG RX REV CODE 636 W/ 250 OVERRIDE (IP): Performed by: ANESTHESIOLOGY

## 2023-11-28 PROCEDURE — 59514 CESAREAN DELIVERY ONLY: CPT | Performed by: OBSTETRICS & GYNECOLOGY

## 2023-11-28 PROCEDURE — 160035 HCHG PACU - 1ST 60 MINS PHASE I: Performed by: OBSTETRICS & GYNECOLOGY

## 2023-11-28 RX ORDER — OXYCODONE HYDROCHLORIDE 10 MG/1
10 TABLET ORAL EVERY 4 HOURS PRN
Status: DISCONTINUED | OUTPATIENT
Start: 2023-11-29 | End: 2023-12-01 | Stop reason: HOSPADM

## 2023-11-28 RX ORDER — EPHEDRINE SULFATE 50 MG/ML
10 INJECTION, SOLUTION INTRAVENOUS
Status: ACTIVE | OUTPATIENT
Start: 2023-11-28 | End: 2023-11-28

## 2023-11-28 RX ORDER — DEXAMETHASONE SODIUM PHOSPHATE 4 MG/ML
INJECTION, SOLUTION INTRA-ARTICULAR; INTRALESIONAL; INTRAMUSCULAR; INTRAVENOUS; SOFT TISSUE PRN
Status: DISCONTINUED | OUTPATIENT
Start: 2023-11-28 | End: 2023-11-28 | Stop reason: SURG

## 2023-11-28 RX ORDER — HYDROMORPHONE HYDROCHLORIDE 1 MG/ML
0.2 INJECTION, SOLUTION INTRAMUSCULAR; INTRAVENOUS; SUBCUTANEOUS
Status: ACTIVE | OUTPATIENT
Start: 2023-11-28 | End: 2023-11-29

## 2023-11-28 RX ORDER — LABETALOL HYDROCHLORIDE 5 MG/ML
5 INJECTION, SOLUTION INTRAVENOUS
Status: DISCONTINUED | OUTPATIENT
Start: 2023-11-28 | End: 2023-11-28 | Stop reason: HOSPADM

## 2023-11-28 RX ORDER — OXYCODONE HYDROCHLORIDE 5 MG/1
5 TABLET ORAL EVERY 4 HOURS PRN
Status: DISCONTINUED | OUTPATIENT
Start: 2023-11-29 | End: 2023-12-01 | Stop reason: HOSPADM

## 2023-11-28 RX ORDER — ONDANSETRON 2 MG/ML
4 INJECTION INTRAMUSCULAR; INTRAVENOUS EVERY 6 HOURS PRN
Status: ACTIVE | OUTPATIENT
Start: 2023-11-28 | End: 2023-11-29

## 2023-11-28 RX ORDER — CEFAZOLIN SODIUM 1 G/3ML
INJECTION, POWDER, FOR SOLUTION INTRAMUSCULAR; INTRAVENOUS PRN
Status: DISCONTINUED | OUTPATIENT
Start: 2023-11-28 | End: 2023-11-28 | Stop reason: SURG

## 2023-11-28 RX ORDER — OXYCODONE HCL 5 MG/5 ML
5 SOLUTION, ORAL ORAL
Status: DISCONTINUED | OUTPATIENT
Start: 2023-11-28 | End: 2023-11-28 | Stop reason: HOSPADM

## 2023-11-28 RX ORDER — DIPHENHYDRAMINE HYDROCHLORIDE 50 MG/ML
25 INJECTION INTRAMUSCULAR; INTRAVENOUS EVERY 6 HOURS PRN
Status: ACTIVE | OUTPATIENT
Start: 2023-11-28 | End: 2023-11-29

## 2023-11-28 RX ORDER — SODIUM CHLORIDE, SODIUM LACTATE, POTASSIUM CHLORIDE, CALCIUM CHLORIDE 600; 310; 30; 20 MG/100ML; MG/100ML; MG/100ML; MG/100ML
INJECTION, SOLUTION INTRAVENOUS PRN
Status: DISCONTINUED | OUTPATIENT
Start: 2023-11-28 | End: 2023-12-01 | Stop reason: HOSPADM

## 2023-11-28 RX ORDER — BUPIVACAINE HYDROCHLORIDE 7.5 MG/ML
INJECTION, SOLUTION INTRASPINAL
Status: COMPLETED | OUTPATIENT
Start: 2023-11-28 | End: 2023-11-28

## 2023-11-28 RX ORDER — DIPHENHYDRAMINE HCL 25 MG
25 TABLET ORAL EVERY 6 HOURS PRN
Status: DISCONTINUED | OUTPATIENT
Start: 2023-11-29 | End: 2023-11-28

## 2023-11-28 RX ORDER — ONDANSETRON 4 MG/1
4 TABLET, ORALLY DISINTEGRATING ORAL EVERY 6 HOURS PRN
Status: DISCONTINUED | OUTPATIENT
Start: 2023-11-29 | End: 2023-11-28

## 2023-11-28 RX ORDER — ONDANSETRON 2 MG/ML
INJECTION INTRAMUSCULAR; INTRAVENOUS PRN
Status: DISCONTINUED | OUTPATIENT
Start: 2023-11-28 | End: 2023-11-28 | Stop reason: SURG

## 2023-11-28 RX ORDER — MORPHINE SULFATE 0.5 MG/ML
INJECTION, SOLUTION EPIDURAL; INTRATHECAL; INTRAVENOUS
Status: COMPLETED | OUTPATIENT
Start: 2023-11-28 | End: 2023-11-28

## 2023-11-28 RX ORDER — KETOROLAC TROMETHAMINE 30 MG/ML
30 INJECTION, SOLUTION INTRAMUSCULAR; INTRAVENOUS EVERY 6 HOURS
Status: DISCONTINUED | OUTPATIENT
Start: 2023-11-28 | End: 2023-11-28

## 2023-11-28 RX ORDER — DIPHENHYDRAMINE HYDROCHLORIDE 50 MG/ML
25 INJECTION INTRAMUSCULAR; INTRAVENOUS EVERY 6 HOURS PRN
Status: DISCONTINUED | OUTPATIENT
Start: 2023-11-29 | End: 2023-11-28

## 2023-11-28 RX ORDER — HYDROMORPHONE HYDROCHLORIDE 1 MG/ML
0.4 INJECTION, SOLUTION INTRAMUSCULAR; INTRAVENOUS; SUBCUTANEOUS
Status: ACTIVE | OUTPATIENT
Start: 2023-11-28 | End: 2023-11-29

## 2023-11-28 RX ORDER — HALOPERIDOL 5 MG/ML
1 INJECTION INTRAMUSCULAR
Status: DISCONTINUED | OUTPATIENT
Start: 2023-11-28 | End: 2023-11-28 | Stop reason: HOSPADM

## 2023-11-28 RX ORDER — HYDROMORPHONE HYDROCHLORIDE 1 MG/ML
0.4 INJECTION, SOLUTION INTRAMUSCULAR; INTRAVENOUS; SUBCUTANEOUS
Status: DISCONTINUED | OUTPATIENT
Start: 2023-11-28 | End: 2023-11-28 | Stop reason: HOSPADM

## 2023-11-28 RX ORDER — DOCUSATE SODIUM 100 MG/1
100 CAPSULE, LIQUID FILLED ORAL 2 TIMES DAILY PRN
Status: DISCONTINUED | OUTPATIENT
Start: 2023-11-28 | End: 2023-12-01 | Stop reason: HOSPADM

## 2023-11-28 RX ORDER — OXYCODONE HCL 5 MG/5 ML
10 SOLUTION, ORAL ORAL
Status: DISCONTINUED | OUTPATIENT
Start: 2023-11-28 | End: 2023-11-28 | Stop reason: HOSPADM

## 2023-11-28 RX ORDER — ONDANSETRON 4 MG/1
4 TABLET, ORALLY DISINTEGRATING ORAL EVERY 6 HOURS PRN
Status: DISCONTINUED | OUTPATIENT
Start: 2023-11-28 | End: 2023-12-01 | Stop reason: HOSPADM

## 2023-11-28 RX ORDER — IBUPROFEN 800 MG/1
800 TABLET ORAL EVERY 8 HOURS PRN
Status: DISCONTINUED | OUTPATIENT
Start: 2023-12-01 | End: 2023-12-01 | Stop reason: HOSPADM

## 2023-11-28 RX ORDER — CITRIC ACID/SODIUM CITRATE 334-500MG
30 SOLUTION, ORAL ORAL ONCE
Status: COMPLETED | OUTPATIENT
Start: 2023-11-28 | End: 2023-11-28

## 2023-11-28 RX ORDER — EPHEDRINE SULFATE 50 MG/ML
5 INJECTION, SOLUTION INTRAVENOUS
Status: DISCONTINUED | OUTPATIENT
Start: 2023-11-28 | End: 2023-11-28 | Stop reason: HOSPADM

## 2023-11-28 RX ORDER — DIPHENHYDRAMINE HYDROCHLORIDE 50 MG/ML
12.5 INJECTION INTRAMUSCULAR; INTRAVENOUS EVERY 6 HOURS PRN
Status: DISPENSED | OUTPATIENT
Start: 2023-11-28 | End: 2023-11-29

## 2023-11-28 RX ORDER — DIPHENHYDRAMINE HCL 25 MG
25 TABLET ORAL EVERY 6 HOURS PRN
Status: DISCONTINUED | OUTPATIENT
Start: 2023-11-28 | End: 2023-12-01 | Stop reason: HOSPADM

## 2023-11-28 RX ORDER — DIPHENHYDRAMINE HYDROCHLORIDE 50 MG/ML
12.5 INJECTION INTRAMUSCULAR; INTRAVENOUS
Status: DISCONTINUED | OUTPATIENT
Start: 2023-11-28 | End: 2023-11-28 | Stop reason: HOSPADM

## 2023-11-28 RX ORDER — SODIUM CHLORIDE, SODIUM LACTATE, POTASSIUM CHLORIDE, CALCIUM CHLORIDE 600; 310; 30; 20 MG/100ML; MG/100ML; MG/100ML; MG/100ML
INJECTION, SOLUTION INTRAVENOUS CONTINUOUS
Status: DISCONTINUED | OUTPATIENT
Start: 2023-11-28 | End: 2023-11-28

## 2023-11-28 RX ORDER — OXYTOCIN 10 [USP'U]/ML
INJECTION, SOLUTION INTRAMUSCULAR; INTRAVENOUS PRN
Status: DISCONTINUED | OUTPATIENT
Start: 2023-11-28 | End: 2023-11-28 | Stop reason: SURG

## 2023-11-28 RX ORDER — DIPHENHYDRAMINE HYDROCHLORIDE 50 MG/ML
12.5 INJECTION INTRAMUSCULAR; INTRAVENOUS EVERY 6 HOURS PRN
Status: ACTIVE | OUTPATIENT
Start: 2023-11-28 | End: 2023-11-29

## 2023-11-28 RX ORDER — ACETAMINOPHEN 500 MG
1000 TABLET ORAL EVERY 6 HOURS
Status: DISCONTINUED | OUTPATIENT
Start: 2023-11-29 | End: 2023-12-01 | Stop reason: HOSPADM

## 2023-11-28 RX ORDER — MEPERIDINE HYDROCHLORIDE 25 MG/ML
12.5 INJECTION INTRAMUSCULAR; INTRAVENOUS; SUBCUTANEOUS
Status: DISCONTINUED | OUTPATIENT
Start: 2023-11-28 | End: 2023-11-28 | Stop reason: HOSPADM

## 2023-11-28 RX ORDER — MIDAZOLAM HYDROCHLORIDE 1 MG/ML
1 INJECTION INTRAMUSCULAR; INTRAVENOUS
Status: DISCONTINUED | OUTPATIENT
Start: 2023-11-28 | End: 2023-11-28 | Stop reason: HOSPADM

## 2023-11-28 RX ORDER — KETOROLAC TROMETHAMINE 30 MG/ML
INJECTION, SOLUTION INTRAMUSCULAR; INTRAVENOUS PRN
Status: DISCONTINUED | OUTPATIENT
Start: 2023-11-28 | End: 2023-11-28 | Stop reason: SURG

## 2023-11-28 RX ORDER — IBUPROFEN 800 MG/1
800 TABLET ORAL EVERY 8 HOURS
Status: DISCONTINUED | OUTPATIENT
Start: 2023-11-29 | End: 2023-11-28

## 2023-11-28 RX ORDER — IPRATROPIUM BROMIDE AND ALBUTEROL SULFATE 2.5; .5 MG/3ML; MG/3ML
3 SOLUTION RESPIRATORY (INHALATION)
Status: DISCONTINUED | OUTPATIENT
Start: 2023-11-28 | End: 2023-11-28 | Stop reason: HOSPADM

## 2023-11-28 RX ORDER — DIPHENHYDRAMINE HYDROCHLORIDE 50 MG/ML
25 INJECTION INTRAMUSCULAR; INTRAVENOUS EVERY 6 HOURS PRN
Status: DISCONTINUED | OUTPATIENT
Start: 2023-11-28 | End: 2023-12-01 | Stop reason: HOSPADM

## 2023-11-28 RX ORDER — SODIUM CHLORIDE, SODIUM GLUCONATE, SODIUM ACETATE, POTASSIUM CHLORIDE AND MAGNESIUM CHLORIDE 526; 502; 368; 37; 30 MG/100ML; MG/100ML; MG/100ML; MG/100ML; MG/100ML
1500 INJECTION, SOLUTION INTRAVENOUS ONCE
Status: DISCONTINUED | OUTPATIENT
Start: 2023-11-28 | End: 2023-11-28 | Stop reason: HOSPADM

## 2023-11-28 RX ORDER — HYDRALAZINE HYDROCHLORIDE 20 MG/ML
5 INJECTION INTRAMUSCULAR; INTRAVENOUS
Status: DISCONTINUED | OUTPATIENT
Start: 2023-11-28 | End: 2023-11-28 | Stop reason: HOSPADM

## 2023-11-28 RX ORDER — OXYCODONE HYDROCHLORIDE 5 MG/1
5 TABLET ORAL EVERY 4 HOURS PRN
Status: ACTIVE | OUTPATIENT
Start: 2023-11-28 | End: 2023-11-29

## 2023-11-28 RX ORDER — IBUPROFEN 800 MG/1
800 TABLET ORAL EVERY 8 HOURS
Status: COMPLETED | OUTPATIENT
Start: 2023-11-28 | End: 2023-12-01

## 2023-11-28 RX ORDER — INDOMETHACIN 50 MG/1
50 CAPSULE ORAL ONCE
Status: DISCONTINUED | OUTPATIENT
Start: 2023-11-28 | End: 2023-11-28 | Stop reason: HOSPADM

## 2023-11-28 RX ORDER — METOCLOPRAMIDE HYDROCHLORIDE 5 MG/ML
10 INJECTION INTRAMUSCULAR; INTRAVENOUS ONCE
Status: COMPLETED | OUTPATIENT
Start: 2023-11-28 | End: 2023-11-28

## 2023-11-28 RX ORDER — ACETAMINOPHEN 500 MG
1000 TABLET ORAL EVERY 6 HOURS PRN
Status: DISCONTINUED | OUTPATIENT
Start: 2023-12-02 | End: 2023-12-01 | Stop reason: HOSPADM

## 2023-11-28 RX ORDER — AZITHROMYCIN 250 MG/1
1000 TABLET, FILM COATED ORAL ONCE
Status: COMPLETED | OUTPATIENT
Start: 2023-11-28 | End: 2023-11-28

## 2023-11-28 RX ORDER — SODIUM CHLORIDE, SODIUM GLUCONATE, SODIUM ACETATE, POTASSIUM CHLORIDE AND MAGNESIUM CHLORIDE 526; 502; 368; 37; 30 MG/100ML; MG/100ML; MG/100ML; MG/100ML; MG/100ML
INJECTION, SOLUTION INTRAVENOUS
Status: DISCONTINUED | OUTPATIENT
Start: 2023-11-28 | End: 2023-11-28 | Stop reason: SURG

## 2023-11-28 RX ORDER — HYDROMORPHONE HYDROCHLORIDE 1 MG/ML
0.2 INJECTION, SOLUTION INTRAMUSCULAR; INTRAVENOUS; SUBCUTANEOUS
Status: DISCONTINUED | OUTPATIENT
Start: 2023-11-28 | End: 2023-11-28 | Stop reason: HOSPADM

## 2023-11-28 RX ORDER — OXYCODONE HYDROCHLORIDE 10 MG/1
10 TABLET ORAL EVERY 4 HOURS PRN
Status: ACTIVE | OUTPATIENT
Start: 2023-11-28 | End: 2023-11-29

## 2023-11-28 RX ORDER — ONDANSETRON 2 MG/ML
4 INJECTION INTRAMUSCULAR; INTRAVENOUS EVERY 6 HOURS PRN
Status: DISCONTINUED | OUTPATIENT
Start: 2023-11-29 | End: 2023-11-28

## 2023-11-28 RX ORDER — ONDANSETRON 2 MG/ML
4 INJECTION INTRAMUSCULAR; INTRAVENOUS
Status: DISCONTINUED | OUTPATIENT
Start: 2023-11-28 | End: 2023-11-28 | Stop reason: HOSPADM

## 2023-11-28 RX ORDER — IBUPROFEN 800 MG/1
800 TABLET ORAL EVERY 8 HOURS PRN
Status: DISCONTINUED | OUTPATIENT
Start: 2023-12-02 | End: 2023-11-28

## 2023-11-28 RX ORDER — ACETAMINOPHEN 500 MG
1000 TABLET ORAL EVERY 6 HOURS
Status: DISPENSED | OUTPATIENT
Start: 2023-11-28 | End: 2023-11-29

## 2023-11-28 RX ORDER — AMOXICILLIN 250 MG/1
250 CAPSULE ORAL EVERY 8 HOURS
Status: DISCONTINUED | OUTPATIENT
Start: 2023-11-30 | End: 2023-11-28

## 2023-11-28 RX ORDER — HYDROMORPHONE HYDROCHLORIDE 1 MG/ML
0.1 INJECTION, SOLUTION INTRAMUSCULAR; INTRAVENOUS; SUBCUTANEOUS
Status: DISCONTINUED | OUTPATIENT
Start: 2023-11-28 | End: 2023-11-28 | Stop reason: HOSPADM

## 2023-11-28 RX ADMIN — SODIUM CITRATE AND CITRIC ACID MONOHYDRATE 30 ML: 500; 334 SOLUTION ORAL at 05:19

## 2023-11-28 RX ADMIN — ACETAMINOPHEN 1000 MG: 500 TABLET ORAL at 14:07

## 2023-11-28 RX ADMIN — OXYTOCIN 10 UNITS: 10 INJECTION, SOLUTION INTRAMUSCULAR; INTRAVENOUS at 07:50

## 2023-11-28 RX ADMIN — ONDANSETRON 4 MG: 2 INJECTION INTRAMUSCULAR; INTRAVENOUS at 06:19

## 2023-11-28 RX ADMIN — PHENYLEPHRINE HYDROCHLORIDE 0.5 MCG/KG/MIN: 10 INJECTION INTRAVENOUS at 05:38

## 2023-11-28 RX ADMIN — METOCLOPRAMIDE 10 MG: 5 INJECTION, SOLUTION INTRAMUSCULAR; INTRAVENOUS at 05:20

## 2023-11-28 RX ADMIN — BUPIVACAINE HYDROCHLORIDE IN DEXTROSE 1.8 ML: 7.5 INJECTION, SOLUTION SUBARACHNOID at 05:48

## 2023-11-28 RX ADMIN — CEFAZOLIN 2 G: 1 INJECTION, POWDER, FOR SOLUTION INTRAMUSCULAR; INTRAVENOUS at 05:40

## 2023-11-28 RX ADMIN — MORPHINE SULFATE 150 MCG: 0.5 INJECTION, SOLUTION EPIDURAL; INTRATHECAL; INTRAVENOUS at 05:48

## 2023-11-28 RX ADMIN — FAMOTIDINE 20 MG: 10 INJECTION, SOLUTION INTRAVENOUS at 05:15

## 2023-11-28 RX ADMIN — IBUPROFEN 800 MG: 800 TABLET, FILM COATED ORAL at 14:07

## 2023-11-28 RX ADMIN — OXYTOCIN 20 UNITS: 10 INJECTION, SOLUTION INTRAMUSCULAR; INTRAVENOUS at 06:12

## 2023-11-28 RX ADMIN — ONDANSETRON 4 MG: 4 TABLET, ORALLY DISINTEGRATING ORAL at 13:00

## 2023-11-28 RX ADMIN — IBUPROFEN 800 MG: 800 TABLET, FILM COATED ORAL at 22:39

## 2023-11-28 RX ADMIN — SODIUM CHLORIDE, SODIUM GLUCONATE, SODIUM ACETATE, POTASSIUM CHLORIDE AND MAGNESIUM CHLORIDE: 526; 502; 368; 37; 30 INJECTION, SOLUTION INTRAVENOUS at 05:33

## 2023-11-28 RX ADMIN — DEXAMETHASONE SODIUM PHOSPHATE 8 MG: 4 INJECTION INTRA-ARTICULAR; INTRALESIONAL; INTRAMUSCULAR; INTRAVENOUS; SOFT TISSUE at 06:02

## 2023-11-28 RX ADMIN — DOCUSATE SODIUM 100 MG: 100 CAPSULE, LIQUID FILLED ORAL at 20:40

## 2023-11-28 RX ADMIN — AZITHROMYCIN DIHYDRATE 1000 MG: 250 TABLET, FILM COATED ORAL at 02:17

## 2023-11-28 RX ADMIN — ACETAMINOPHEN 1000 MG: 500 TABLET ORAL at 20:40

## 2023-11-28 RX ADMIN — AMPICILLIN SODIUM 2000 MG: 2 INJECTION, POWDER, FOR SOLUTION INTRAVENOUS at 02:27

## 2023-11-28 RX ADMIN — KETOROLAC TROMETHAMINE 30 MG: 30 INJECTION, SOLUTION INTRAMUSCULAR; INTRAVENOUS at 06:31

## 2023-11-28 ASSESSMENT — EDINBURGH POSTNATAL DEPRESSION SCALE (EPDS)
THINGS HAVE BEEN GETTING ON TOP OF ME: NO, MOST OF THE TIME I HAVE COPED QUITE WELL
I HAVE BEEN ANXIOUS OR WORRIED FOR NO GOOD REASON: NO, NOT AT ALL
I HAVE FELT SCARED OR PANICKY FOR NO GOOD REASON: NO, NOT AT ALL
I HAVE BEEN SO UNHAPPY THAT I HAVE BEEN CRYING: ONLY OCCASIONALLY
I HAVE BEEN ABLE TO LAUGH AND SEE THE FUNNY SIDE OF THINGS: AS MUCH AS I ALWAYS COULD
THE THOUGHT OF HARMING MYSELF HAS OCCURRED TO ME: NEVER
I HAVE LOOKED FORWARD WITH ENJOYMENT TO THINGS: RATHER LESS THAN I USED TO
I HAVE BLAMED MYSELF UNNECESSARILY WHEN THINGS WENT WRONG: YES, SOME OF THE TIME
I HAVE FELT SAD OR MISERABLE: NOT VERY OFTEN
I HAVE BEEN SO UNHAPPY THAT I HAVE HAD DIFFICULTY SLEEPING: NOT AT ALL

## 2023-11-28 ASSESSMENT — PAIN DESCRIPTION - PAIN TYPE
TYPE: SURGICAL PAIN;ACUTE PAIN
TYPE: SURGICAL PAIN;ACUTE PAIN
TYPE: ACUTE PAIN;SURGICAL PAIN
TYPE: SURGICAL PAIN;ACUTE PAIN
TYPE: ACUTE PAIN;SURGICAL PAIN

## 2023-11-28 ASSESSMENT — PAIN SCALES - GENERAL: PAIN_LEVEL: 0

## 2023-11-28 NOTE — PROGRESS NOTES
0655: Pt into PACU 3 for recovery. Report received from Yee ROJAS. POC discussed.     0820: Pt up to postpartum via SHERIF ortega at bedside. Report to Gabrielle ROJAS. POC discussed.

## 2023-11-28 NOTE — LACTATION NOTE
This note was copied from a baby's chart.  Mom is a 22 y/o P1 who delivered baby boy at 26.6 wks. Mom was a care flight transfer from [facility].   Education provided for mother regarding breast pumping Q 3 hours. Flange assessment done and it appears that 22 mm would fit better. After placing inserts, mom states that suction is less with smaller flanges and felt more comfortable with the 25 mm.  LC wrote setting on white board.  FOB at bedside and LC discussed cleaning with father.  Mom has no questions and understands that it is not uncommon to have no collectable expressed milk initially.  Mom aware of lactation's role in the NICU as baby remains admitted.

## 2023-11-28 NOTE — ANESTHESIA PREPROCEDURE EVALUATION
Case: [id number] Date/Time: 23    Procedure:  SECTION, PRIMARY (Abdomen)    Anesthesia type: Spinal    Pre-op diagnosis: fetal intolerance    Location: [facility] OR 01 / SURGERY LABOR AND DELIVERY    Surgeons: Kevin Christian M.D.            Relevant Problems   OB   (positive)  labor in second trimester without delivery       Physical Exam    Airway   Mallampati: II  TM distance: >3 FB  Neck ROM: full       Cardiovascular - normal exam  Rhythm: regular  Rate: normal  (-) murmur     Dental - normal exam           Pulmonary - normal exam  Breath sounds clear to auscultation     Abdominal    Neurological - normal exam                   Anesthesia Plan    ASA 3- EMERGENT   ASA physical status 3 criteria: other (comment)ASA physical status emergent criteria: non-reassuring fetal heart tones    Plan - spinal   Neuraxial block will be primary anesthetic                Postoperative Plan: Postoperative administration of opioids is intended.    Pertinent diagnostic labs and testing reviewed    Informed Consent:    Anesthetic plan and risks discussed with patient.

## 2023-11-28 NOTE — ANESTHESIA POSTPROCEDURE EVALUATION
Patient: Beka Du    Procedure Summary       Date: 23 Room / Location: [facility] OR 01 / SURGERY LABOR AND DELIVERY    Anesthesia Start: 533 Anesthesia Stop: 659    Procedure:  SECTION, PRIMARY (Bilateral: Abdomen) Diagnosis: (26&6 fetal intolerance)    Surgeons: Kevin Christian M.D. Responsible Provider: Mathew Nunez M.D.    Anesthesia Type: spinal ASA Status: 3 - Emergent            Final Anesthesia Type: spinal  Last vitals  BP   Blood Pressure: 103/57    Temp   36.6 °C (97.8 °F)    Pulse   (!) 111   Resp   16    SpO2   96 %      Anesthesia Post Evaluation    Patient location during evaluation: PACU  Patient participation: complete - patient participated  Level of consciousness: awake and alert  Pain score: 0    Airway patency: patent  Anesthetic complications: no  Cardiovascular status: hemodynamically stable  Respiratory status: acceptable  Hydration status: euvolemic    PONV: none          No notable events documented.     Nurse Pain Score: 0 (NPRS)

## 2023-11-28 NOTE — OP REPORT
DATE OF SERVICE: 2023     PREOPERATIVE DIAGNOSES:  1.  Intrauterine pregnancy at 26w6d  2.  Premature rupture membranes  3.  Nonreassuring fetal heart tones     POSTOPERATIVE DIAGNOSES:  1.  Intrauterine pregnancy at 26w6d  2.  same    PROCEDURE PERFORMED: Primary low transverse  section.     SURGEON: Kevin Christian MD     ASSISTANT: Heather Waterman MS3     ANESTHESIA:  Spinal.     ANESTHESIOLOGIST: Mathew Nunez MD     SPECIMEN: Cord gases and placenta sent for evaluation     ESTIMATED BLOOD LOSS: 300 mL     FINDINGS:  A viable male infant, weight 1098 g, Apgars of 5 and 7 in vertex    presentation with clear amniotic fluid.  There was a normal uterus,   tubes, and ovaries bilaterally.     COMPLICATIONS:  None.     PROCEDURE:  After appropriate consents were obtained, the patient was taken to the operating room where spinal  anesthesia was applied without complications.  The patient was then prepped and draped in the usual sterile manner.  Clamp test on the skin verified adequate anesthesia.  A Pfannenstiel incision was made with a scalpel 3cm superior to the pubic symphysis and extended down to the rectus fascia.  The rectus fascia was incised with the scalpel and tented up. The underlying rectus muscle was  from the fascia first inferiorly and then superiorly using the brock scissors.  The rectus muscle was  bluntly in the midline.  The peritoneum was entered bluntly in the midline. The peritoneum incision was extended superiorly and inferiorly with the Metzenbaum scissors with great care to avoid injury to underlying bowel or bladder.  Emerson retractor was placed. The vesicouterine peritoneum was tented up and entered with Metzenbaum scissors, and a bladder flap was created.  An incision was made into the lower uterine segment transversely and the incision was extended bluntly.  Amniotomy was performed and there was noted to be clear amniotic fluid. The Infant's head was grasped and  delivered atraumatically followed by the remainder of the body without any complications.  The mouth and nares were suctioned. The cord was doubly clamped and cut and the infant was handed off to awaiting neonatology team.  The placenta was then allowed to spontaneously deliver. The uterus was cleared of clots and debris.  The hysterotomy incision was reapproximated with 1-0 Vicryl suture in a running locked fashion. A second layer of the same suture was placed to imbricate the incision creating a 2 layer closure.  Hemostasis was noted.  The tubes and ovaries were examined and noted to be normal.  The pelvis was irrigated with normal saline. The pericolic gutters were examined and any blood clots were removed.  The Emerson retractor was removed. The peritoneum was reapproximated with 3-0 Vicryl suture running.  The rectus muscles were examined and hemostatic.  The fascia was reapproximated with 0 Vicryl suture running.  The subcutaneous fat was irrigated and any small bleeders were bovied for hemostasis.  The subcutaneous fat was then reapproximated with 3-0 Vicryl suture running.  The skin was reapproximated with 4-0 Monocryl suture running. Steri strips and a dressing were placed.  Sponge, needle, instrument, and lap counts were correct x2.  Patient tolerated the procedure well and went to recovery room in stable condition.        ____________________________________  Kvein Christian MD

## 2023-11-28 NOTE — PROGRESS NOTES
Patient transferred from labor and delivery on [facility] with FOB accompanying with belongings. Two RN verification of infant and parent armbands. Report received from ANITHA Menjivar RN. Patient oriented to unit and POC including, EPDS, BC, I&O chart, feeding frequencies, safe sleeping practices, and emergency cord use. Assessment completed, fundus firm and palpable, lochia scant to light. Patient has a wang catheter in place with adequate output. Pain management discussed. Pitocin infusing at 125 ml/hr. IV patent, no s/s of infiltration at insertion site. Patient encouraged to call with needs.      0845- Patient started on hospital grade electric breast pump. Education provided to pt regarding pumping every 3hrs, settings of pump: initial speed setting at 80CPM for 2 minutes then decrease to 60 CPM for remainder of pumping session, suction set at 25% per pt comfort. Pt instructed to pump for 15minutes total every 3hrs. Reviewed cleaning of parts and breastmilk storage, questions answered. Educated to watch david hand expressing video to massage breast after pumping. Encouraged patient to call with questions.

## 2023-11-28 NOTE — PROGRESS NOTES
23-year-old  1 para 0 at 26 weeks and 6 days gestational age.  Patient admitted secondary to  labor approximately 2 weeks ago.  Began to have contractions yesterday, was started on magnesium sulfate for neuro protection and given a single dose of Indocin.  She had previously received steroids.    Patient continued to have contractions throughout the day which worsened at night, she underwent spontaneous rupture membranes approximately 0130 hrs.  Unchanged cervical exam at that time.  Given the rupture membranes she was started on latency antibiotics.      However, patient continued to have contractions at that time with worsening frequency of variable decelerations.    Given this increase in the frequency of decelerations as well as the verito of the said decelerations reaching 60 bpm, a decision was then made to proceed with primary .    Discussed with the patient the risks of  delivery. The risks include DVT/pulmonary embolism, pelvic scarring, pelvic pain, infection, bleeding, scarring, damage to other organs in the area of operation, anesthesia complications, and death. Specifically organs that can be damaged are bowel, bladder, ureters. I also discussed with the patient the risk of wound infection and wound breakdown. We discussed that these risks are greater in people that have diabetes or obesity. I also discussed the risk of emergency blood transfusion during procedure as well as emergency hysterectomy during procedure. Patient had the opportunity to ask questions regarding procedures. All questions answered to the patient's satisfaction. Pt agrees to proceed with surgery.

## 2023-11-28 NOTE — PROGRESS NOTES
1900:  Report received from Zaria GONSALEZ RN; POC discussed.    1953: Order received from Dr Christian to discontinue magnesium Sulfate infusion when present bag completes infusion.    2115: pt has c/o a h/a all evening; stat magnesium level drawn; pt remains on infusion at this time.    2149: Mag level  4.7. Tylenol 650 mg po given for h/a.    2153: New magnesium bag up. Uc's noted. Variables occasional.    0130: pt up to bedside commode;  spouse came to nurses desk stating that pt is sure she just broke her water. Several staff members came to bedside. Pt placed back in bed, FHT confirmed stable. Speculum exam done, fluids confirmed and collected.  SVE performed by myself, 3/ thick/ -3; unsure of presenting part.  All orders received from Dr Christian and updates given to rakan BARFIELD.  Orders received to initiate antibiotic therapy .    0200: Dr Man called and updated on pt's status. No new orders received.    0346: pt requesting bedpan.  Pt voided and had large soft BM.    0450: Dr Christian notified of increase in contraction frequency and intensity. Baby having repetitive variables; variability still moderate.     0500: Dr Christian called and updated. Uc's Q 4-5 min; variables with CHAU in the 60's.  Pt feeling her uc's more now. Pt to be preped for emergent c/s per MD.    0528: pt to OR 1.

## 2023-11-28 NOTE — OR SURGEON
Immediate Post OP Note    PreOp Diagnosis: Intrauterine pregnancy at 26 weeks and 6-day gestational age, premature rupture membranes, nonreassuring fetal heart tones      PostOp Diagnosis: Primary low-transverse       Procedure(s):   SECTION, PRIMARY - Wound Class: Clean Contaminated    Surgeon(s):  Kevin Christian M.D.    Anesthesiologist/Type of Anesthesia:  Anesthesiologist: Mathew Nunez M.D./Spinal    Surgical Staff:  Circulator: Tiara Andrews R.N.  Scrub Person: Jessica WELSH Circulator Assistant: Yee DUNNE R.N.    Specimens removed if any:  ID Type Source Tests Collected by Time Destination   A :  Tissue Placenta PATHOLOGY SPECIMEN Kevin Christian M.D. 2023  6:43 AM        Estimated Blood Loss: 300 mL    Findings: Male infant, Apgars of 5 and 7, weight 1098 g.  Normal placenta with three-vessel cord.    Complications: None        2023 8:13 AM Kevin Christian M.D.

## 2023-11-28 NOTE — ANESTHESIA PROCEDURE NOTES
Spinal Block    Date/Time: 11/28/2023 5:43 AM    Performed by: Mathew Nunez M.D.  Authorized by: Mathew Nunez M.D.    Start Time:  11/28/2023 5:43 AM  End Time:  11/28/2023 5:48 AM  Reason for Block: primary anesthetic    patient identified, IV checked, site marked, risks and benefits discussed, surgical consent, monitors and equipment checked, pre-op evaluation and timeout performed    Patient Position:  Sitting  Prep: ChloraPrep, patient draped and sterile technique    Monitoring:  Blood pressure, continuous pulse oximetry and heart rate  Approach:  Midline  Location:  L3-4  Injection Technique:  Single-shot  Skin infiltration:  Lidocaine  Strength:  1%  Dose:  3ml  Needle Type:  Pencan  Needle Gauge:  25 G  CSF flowing pre/post injection:  Yes  Sensory Level:  T4

## 2023-11-28 NOTE — ANESTHESIA TIME REPORT
Anesthesia Start and Stop Event Times       Date Time Event    11/28/2023 0530 Ready for Procedure     0533 Anesthesia Start     0659 Anesthesia Stop          Responsible Staff  11/28/23      Name Role Begin End    Mathew Nunez M.D. Anesth 0533 0659          Overtime Reason:  no overtime (within assigned shift)    Comments:

## 2023-11-28 NOTE — PROGRESS NOTES
Patient IV leaking. RN attempted to replace dressing and IV was still noted to be leaking during pitocin infusion. Patient states that she does not want another IV in place at this time as her current IV has been in place for over a week. Patient pitocin infused over 50% of last bag and bleeding is scant to light. Notified MD of patient current IV situation and IV medications scheduled, OK to keep IV out and change all IV meds to PO at this time.

## 2023-11-29 LAB
ERYTHROCYTE [DISTWIDTH] IN BLOOD BY AUTOMATED COUNT: 44 FL (ref 35.9–50)
HCT VFR BLD AUTO: 28.5 % (ref 37–47)
HGB BLD-MCNC: 9.4 G/DL (ref 12–16)
MCH RBC QN AUTO: 30.4 PG (ref 27–33)
MCHC RBC AUTO-ENTMCNC: 33 G/DL (ref 32.2–35.5)
MCV RBC AUTO: 92.2 FL (ref 81.4–97.8)
PLATELET # BLD AUTO: 260 K/UL (ref 164–446)
PMV BLD AUTO: 10 FL (ref 9–12.9)
RBC # BLD AUTO: 3.09 M/UL (ref 4.2–5.4)
WBC # BLD AUTO: 11 K/UL (ref 4.8–10.8)

## 2023-11-29 PROCEDURE — 85027 COMPLETE CBC AUTOMATED: CPT

## 2023-11-29 PROCEDURE — A9270 NON-COVERED ITEM OR SERVICE: HCPCS | Performed by: OBSTETRICS & GYNECOLOGY

## 2023-11-29 PROCEDURE — A9270 NON-COVERED ITEM OR SERVICE: HCPCS | Performed by: ANESTHESIOLOGY

## 2023-11-29 PROCEDURE — 700102 HCHG RX REV CODE 250 W/ 637 OVERRIDE(OP): Performed by: OBSTETRICS & GYNECOLOGY

## 2023-11-29 PROCEDURE — 36415 COLL VENOUS BLD VENIPUNCTURE: CPT

## 2023-11-29 PROCEDURE — 770002 HCHG ROOM/CARE - OB PRIVATE (112)

## 2023-11-29 PROCEDURE — 700102 HCHG RX REV CODE 250 W/ 637 OVERRIDE(OP): Performed by: ANESTHESIOLOGY

## 2023-11-29 RX ADMIN — PRENATAL WITH FERROUS FUM AND FOLIC ACID 1 TABLET: 3080; 920; 120; 400; 22; 1.84; 3; 20; 10; 1; 12; 200; 27; 25; 2 TABLET ORAL at 08:34

## 2023-11-29 RX ADMIN — ACETAMINOPHEN 1000 MG: 500 TABLET ORAL at 03:30

## 2023-11-29 RX ADMIN — IBUPROFEN 800 MG: 800 TABLET, FILM COATED ORAL at 06:23

## 2023-11-29 RX ADMIN — ACETAMINOPHEN 1000 MG: 500 TABLET ORAL at 17:05

## 2023-11-29 RX ADMIN — IBUPROFEN 800 MG: 800 TABLET, FILM COATED ORAL at 15:24

## 2023-11-29 RX ADMIN — ACETAMINOPHEN 1000 MG: 500 TABLET ORAL at 10:34

## 2023-11-29 ASSESSMENT — PAIN DESCRIPTION - PAIN TYPE
TYPE: ACUTE PAIN;SURGICAL PAIN
TYPE: ACUTE PAIN
TYPE: ACUTE PAIN;SURGICAL PAIN
TYPE: ACUTE PAIN;SURGICAL PAIN

## 2023-11-29 NOTE — PROGRESS NOTES
Bedside report received from dayshift RN. 12hr chart check complete, MAR and orders reviewed. Pt awake and alert with family at beside for support, denies pain or needs at this time, call light within reach, dura checks in place.

## 2023-11-29 NOTE — CARE PLAN
The patient is Stable - Low risk of patient condition declining or worsening    Shift Goals  Clinical Goals: Maintain fundus firm, lochia light; pain management; pt to ambulate  Patient Goals: visit infant in NICU  Family Goals:     Progress made toward(s) clinical / shift goals:  Fundus firm, lochia scant; pt reported pain relief with scheduled pain medication; pt ambulated in hallways and tolerated well.

## 2023-11-29 NOTE — PROGRESS NOTES
0708- Report received from CRYSTAL Alexis.  Assumed care of patient.  MD at bedside at this time.    0834- Patient assessment done.  Patient reported she is voiding without difficulty and passing flatus.  Patient denied dizziness and reported that she is walking without difficulty.  Patient instructed to ambulate in hallways four times a day.  Patient instructed to use IS ten times every hour.  Discussed pain management plan, to include MD orders for scheduled and prn pain medication.  Patient reported she is using her breast pump.  Reviewed plan of care.  Patient verbalized understanding.  1524- Patient back in the room after visiting her infant in the NICU.  1705- Patient requested vital signs be taken now, so that she may go to sleep now.  Patient requested bedside report be done outside her room.  1725- Report given to CRYSTAL Zavala, who assumed care of patient.

## 2023-11-29 NOTE — PROGRESS NOTES
Size L Abdominal binder tube delivered to station 32. Please let traction know if any additional binders need to be sent for better fit

## 2023-11-29 NOTE — PROGRESS NOTES
Obstetrics & Gynecology Post-Delivery Progress Note    Date of Service      23 y.o.  s/p  for fetal distress  Delivery date: 2023      Subjective  Pt reports she feels well today.  Hemoglobin went from 10.7 preoperatively to 9.4 postoperatively.  She reports her pain is well-controlled with MAR medications, and she is drinking fluid without issue.  She reports that she was nauseous after the  yesterday and only had a little bit of eat including some crackers, but feeling hungry this morning; breakfast is on its way.  She reports she is ambulating around the room into the NICU without lightheadedness.  She had a bowel movement last night, she reports voiding without issue and is passing gas.  She has bloody vaginal discharge that amounts to about a periods of worth, but she reports it is already decreasing in volume.    Pain: Well controlled  Bleeding: lochia moderate but decreasing.  Tolerating PO: yes, did not eat a lot yesterday but is feeling hungry this morning  Voiding: without difficulty  Ambulating: yes  Passing flatus: Yes, and has had bowel movement  Feeding:   is in the NICU, she is planning to pump at this time      Objective  24hr VS:  Temp:  [36.1 °C (97 °F)-36.7 °C (98 °F)] 36.1 °C (97 °F)  Pulse:  [63-99] 70  Resp:  [16-18] 17  BP: ()/(50-70) 97/53  SpO2:  [90 %-97 %] 90 %    Physical Exam  Gen: well appearing, no apparent distress, resting comfortably in bed  CV: rrr, no m/r/g  Resp: CTAB, symmetric breath sounds  Abd: soft, nontender, nondistended  Fundus: firm and at umbilicus  Incision: healing well, no significant drainage, no dehiscence, and no significant erythema  Ext: symmetric, no peripheral edema, calves nontender    Labs:  Recent Labs     23  1055 23  0643   WBC 13.3* 11.0*   RBC 3.50* 3.09*   HEMOGLOBIN 10.7* 9.4*   HEMATOCRIT 31.3* 28.5*   MCV 89.4 92.2   MCH 30.6 30.4   RDW 42.4 44.0   PLATELETCT 298 260   MPV 10.2 10.0    NEUTSPOLYS 80.50*  --    LYMPHOCYTES 14.50*  --    MONOCYTES 3.50  --    EOSINOPHILS 0.70  --    BASOPHILS 0.20  --          Medications  acetaminophen, 1,000 mg, Oral, Q6HR  acetaminophen, 1,000 mg, Oral, Q6HRS  ibuprofen, 800 mg, Oral, Q8HRS  prenatal plus vitamin, 1 Tablet, Oral, Daily-0800      PRN medications: LR, acetaminophen **FOLLOWED BY** [START ON 2023] acetaminophen, oxyCODONE immediate-release, oxyCODONE immediate release, docusate sodium, tetanus-dipth-acell pertussis, ibuprofen **FOLLOWED BY** [START ON 2023] ibuprofen, diphenhydrAMINE **OR** diphenhydrAMINE, [DISCONTINUED] ondansetron **OR** ondansetron, NIFEdipine IR, acetaminophen      Assessment/Plan  Beka Du is a 23 y.o.yo  postpartum day #1  s/p  for fetal distress    - Post care: meeting all goals  -Hemoglobin dropped from 10.7 now 9.4   - Pain: controlled  - Rh+, Rubella Immune  - Method of Feeding:  Patient initially planned to breast-feed, patient's  is currently in the NICU when she is pumping.  She has met with a lactation consultant  - Method of Contraception: abstinence  VTE prophylaxis: SCDs    - Disposition: likely home PPD2-3     Jeffrey Villar M.D.  PGY-1, [facility]

## 2023-11-29 NOTE — LACTATION NOTE
This note was copied from a baby's chart.  Lactation follow-up visit    Baby 26.6 weeks- LPI in NICU, , MOB Hx C/S, GDM (testing not complete). Mother reports she has been pumping regularly, 8-10 pump sessions in 24 hours or every 3 hours. Mother currently collecting drops on swab & taking to NICU. Pump settings reviewed speed 80/60, suction 30% x 15-18 minutes then hand express x 2-3 minutes. Mother has 22.5 flanges, however prefers 25 mm fit. Mother does not have a pump at home, recommended mother rent HG pump through [facility] for home.    MOB has  insurance, NNB Resource sheet given with review.    Information sheets provided with review:  Storage Guidelines  HG Pump rental  Your LPI  NNB Resource sheet  Pump Schedule

## 2023-11-29 NOTE — CARE PLAN
Problem: Altered Physiologic Condition  Goal: Patient physiologically stable as evidenced by normal lochia, palpable uterine involution and vitals within normal limits  Outcome: Progressing     Problem: Infection - Postpartum  Goal: Postpartum patient will be free of signs and symptoms of infection  Outcome: Progressing     Problem: Pain - Standard  Goal: Alleviation of pain or a reduction in pain to the patient’s comfort goal  Outcome: Progressing     The patient is Stable - Low risk of patient condition declining or worsening    Shift Goals  Clinical Goals: pain control, no s/s infection, lochia wdl  Patient Goals: pain control, rest, visit infant in NICU, use breast pump  Family Goals: support, rest    Progress made toward(s) clinical / shift goals:  Pain well controlled with non-narcotic analgesia, pt is able to ambulate and care for self without difficulty. Lochia remains light/scant without clots expressed, performing reji care and pad changes independently. No s/s infection noted on assessment, remains afebrile.    Patient is not progressing towards the following goals: NA

## 2023-11-30 PROCEDURE — 700102 HCHG RX REV CODE 250 W/ 637 OVERRIDE(OP): Performed by: OBSTETRICS & GYNECOLOGY

## 2023-11-30 PROCEDURE — A9270 NON-COVERED ITEM OR SERVICE: HCPCS | Performed by: OBSTETRICS & GYNECOLOGY

## 2023-11-30 PROCEDURE — 700102 HCHG RX REV CODE 250 W/ 637 OVERRIDE(OP)

## 2023-11-30 PROCEDURE — 770002 HCHG ROOM/CARE - OB PRIVATE (112)

## 2023-11-30 PROCEDURE — A9270 NON-COVERED ITEM OR SERVICE: HCPCS

## 2023-11-30 RX ORDER — SIMETHICONE 125 MG
125 TABLET,CHEWABLE ORAL 3 TIMES DAILY PRN
Status: DISCONTINUED | OUTPATIENT
Start: 2023-11-30 | End: 2023-12-01 | Stop reason: HOSPADM

## 2023-11-30 RX ADMIN — IBUPROFEN 800 MG: 800 TABLET, FILM COATED ORAL at 18:49

## 2023-11-30 RX ADMIN — IBUPROFEN 800 MG: 800 TABLET, FILM COATED ORAL at 07:44

## 2023-11-30 RX ADMIN — IBUPROFEN 800 MG: 800 TABLET, FILM COATED ORAL at 00:15

## 2023-11-30 RX ADMIN — IBUPROFEN 800 MG: 800 TABLET, FILM COATED ORAL at 23:41

## 2023-11-30 RX ADMIN — ACETAMINOPHEN 1000 MG: 500 TABLET ORAL at 23:41

## 2023-11-30 RX ADMIN — ACETAMINOPHEN 1000 MG: 500 TABLET ORAL at 06:12

## 2023-11-30 RX ADMIN — PRENATAL WITH FERROUS FUM AND FOLIC ACID 1 TABLET: 3080; 920; 120; 400; 22; 1.84; 3; 20; 10; 1; 12; 200; 27; 25; 2 TABLET ORAL at 07:44

## 2023-11-30 RX ADMIN — ACETAMINOPHEN 1000 MG: 500 TABLET ORAL at 00:15

## 2023-11-30 RX ADMIN — ACETAMINOPHEN 1000 MG: 500 TABLET ORAL at 14:30

## 2023-11-30 RX ADMIN — SIMETHICONE 125 MG: 125 TABLET, CHEWABLE ORAL at 20:19

## 2023-11-30 ASSESSMENT — PAIN DESCRIPTION - PAIN TYPE
TYPE: ACUTE PAIN
TYPE: ACUTE PAIN;SURGICAL PAIN
TYPE: ACUTE PAIN;SURGICAL PAIN
TYPE: ACUTE PAIN
TYPE: ACUTE PAIN;SURGICAL PAIN
TYPE: ACUTE PAIN
TYPE: ACUTE PAIN;SURGICAL PAIN
TYPE: ACUTE PAIN

## 2023-11-30 NOTE — LACTATION NOTE
This note was copied from a baby's chart.  Follow up lactation : Mom reports she is harvesting more colostrum today and collecting in syringes. Mom reports thst baby is doing OK and will a PICC line today. LC provided emotional support.   Mom denies any discomfort with pump ing an currently has no questions.  Mom aware of lactation's role as baby remains admitted.

## 2023-11-30 NOTE — PROGRESS NOTES
"Beka Du  POD 2    Subjective:   23-year-old  1 para 0-1-0-1 status post primary low-transverse  due to nonreassuring fetal heart tones at 26 weeks 6 days gestational age.  Patient doing well.  She is ambulating, tolerating p.o.  Has been to the NICU to visit baby.  Pain well controlled    /76   Pulse 96   Temp 36.8 °C (98.2 °F) (Temporal)   Resp 18   Ht 1.651 m (5' 5\")   Wt 88.9 kg (196 lb)   SpO2 97%   Breast tenderness no, Engorgement yes, Mastitis no  CVS: RRR  Resp: CTA bilaterally  Abdomen: Abdomen soft, non-tender. BS normal. No masses,  No organomegaly  Incision: clean/dry/intact, dressing removed, staples/steri strips intact  Fundus: Tender no, below umbilicus Yes,   Extremities: Normal    Meds:   No current facility-administered medications on file prior to encounter.     Current Outpatient Medications on File Prior to Encounter   Medication Sig Dispense Refill    Prenatal MV-Min-Fe Fum-FA-DHA (PRENATAL 1 PO) Take 1 Tablet by mouth every day.      aspirin (ASA) 81 MG Chew Tab chewable tablet Chew 81 mg every day.         Lab:   Recent Results (from the past 48 hour(s))   Histology Request    Collection Time: 23  8:00 AM   Result Value Ref Range    Pathology Request Sent to Histo    COD - Adult (Type and Screen)    Collection Time: 23  1:22 PM   Result Value Ref Range    ABO Grouping Only A     Rh Grouping Only POS     Antibody Screen-Cod NEG    CBC without differential    Collection Time: 23  6:43 AM   Result Value Ref Range    WBC 11.0 (H) 4.8 - 10.8 K/uL    RBC 3.09 (L) 4.20 - 5.40 M/uL    Hemoglobin 9.4 (L) 12.0 - 16.0 g/dL    Hematocrit 28.5 (L) 37.0 - 47.0 %    MCV 92.2 81.4 - 97.8 fL    MCH 30.4 27.0 - 33.0 pg    MCHC 33.0 32.2 - 35.5 g/dL    RDW 44.0 35.9 - 50.0 fL    Platelet Count 260 164 - 446 K/uL    MPV 10.0 9.0 - 12.9 fL       Assessment and Plan  POD#2 s/p primary LTCS at 26.6 weeks  Doing well postpartum, meeting all " postop milestones    Continue Routine postpartum care  Ambulation encouraged     Likely plan for discharge tomorrow.  Hemoglobin 9.4.  Encourage ambulation.

## 2023-11-30 NOTE — ED PROVIDER NOTES
Patient was a transfer from [facility] from  labor.  She was directly admitted and did not spend time in the OB ED.  See admission H&P for details.

## 2023-11-30 NOTE — DISCHARGE PLANNING
Discharge Planning Assessment Post Partum    Reason for Referral: NICU  Address: [address] [place] [address]   Type of Living Situation:Stable  Mom Diagnosis: Postpartum  Baby Diagnosis: NICU 27.1   Primary Language: English     Name of Baby: MOB working on a name   Father of the Baby: Manpreet Breen  Involved in baby’s care? Yes  Contact Information: [phone]    Prenatal Care: Yes  Mom's PCP: None  PCP for new baby:List given     Support System: Yes  Coping/Bonding between mother & baby: MOB coping/bonding   Source of Feeding: Breast  Supplies for Infant:MOB working on supplies    Mom's Insurance:   Baby Covered on Insurance:Yes  Mother Employed/School: Yes  Other children in the home/names & ages: First    Financial Hardship/Income: None   Mom's Mental status: Stable and alert   Services used prior to admit: None    CPS History: None  Psychiatric History: None  Domestic Violence History: None  Drug/ETOH History: None    Resources Provided:  provided pediatrician list   Referrals Made: [facility]      Clearance for Discharge: Baby is cleared to discharge with MOB and FOB when medically cleared      Ongoing Plan: will continue to follow and provide support

## 2023-11-30 NOTE — PROGRESS NOTES
Assessment complete, FFU, lochia wnl, pt chart reviewed, Pain assessed no f/u required at this time, call light within reach, pt knows to notify staff for any requests, questions and answers offered.

## 2023-11-30 NOTE — CARE PLAN
Problem: Altered Physiologic Condition  Goal: Patient physiologically stable as evidenced by normal lochia, palpable uterine involution and vitals within normal limits  Outcome: Progressing     Problem: Infection - Postpartum  Goal: Postpartum patient will be free of signs and symptoms of infection  Outcome: Progressing     Problem: Pain - Standard  Goal: Alleviation of pain or a reduction in pain to the patient’s comfort goal  Outcome: Progressing     The patient is Stable - Low risk of patient condition declining or worsening    Shift Goals  Clinical Goals: Stable VS, pain control  Patient Goals: visit infant in NICU  Family Goals: support    Progress made toward(s) clinical / shift goals:  Pain well controlled with non-narcotic analgesia, pt is able to ambulate and care for self and visit infant in NICU without difficulty. Lochia remains scant without clots expressed, performing reji care and pad changes independently. No s/s infection noted on assessment, remains afebrile.    Patient is not progressing towards the following goals: NA

## 2023-12-01 ENCOUNTER — PHARMACY VISIT (OUTPATIENT)
Dept: PHARMACY | Facility: MEDICAL CENTER | Age: 23
End: 2023-12-01
Payer: COMMERCIAL

## 2023-12-01 VITALS
TEMPERATURE: 98 F | RESPIRATION RATE: 18 BRPM | SYSTOLIC BLOOD PRESSURE: 116 MMHG | WEIGHT: 196 LBS | DIASTOLIC BLOOD PRESSURE: 65 MMHG | BODY MASS INDEX: 32.65 KG/M2 | OXYGEN SATURATION: 96 % | HEART RATE: 100 BPM | HEIGHT: 65 IN

## 2023-12-01 LAB
ERYTHROCYTE [DISTWIDTH] IN BLOOD BY AUTOMATED COUNT: 42.6 FL (ref 35.9–50)
HCT VFR BLD AUTO: 30.1 % (ref 37–47)
HGB BLD-MCNC: 10.2 G/DL (ref 12–16)
MCH RBC QN AUTO: 30.7 PG (ref 27–33)
MCHC RBC AUTO-ENTMCNC: 33.9 G/DL (ref 32.2–35.5)
MCV RBC AUTO: 90.7 FL (ref 81.4–97.8)
PLATELET # BLD AUTO: 266 K/UL (ref 164–446)
PMV BLD AUTO: 9.7 FL (ref 9–12.9)
RBC # BLD AUTO: 3.32 M/UL (ref 4.2–5.4)
WBC # BLD AUTO: 11.7 K/UL (ref 4.8–10.8)

## 2023-12-01 PROCEDURE — RXMED WILLOW AMBULATORY MEDICATION CHARGE: Performed by: NURSE PRACTITIONER

## 2023-12-01 PROCEDURE — 700102 HCHG RX REV CODE 250 W/ 637 OVERRIDE(OP): Performed by: OBSTETRICS & GYNECOLOGY

## 2023-12-01 PROCEDURE — 85027 COMPLETE CBC AUTOMATED: CPT

## 2023-12-01 PROCEDURE — A9270 NON-COVERED ITEM OR SERVICE: HCPCS | Performed by: OBSTETRICS & GYNECOLOGY

## 2023-12-01 PROCEDURE — 36415 COLL VENOUS BLD VENIPUNCTURE: CPT

## 2023-12-01 RX ORDER — IBUPROFEN 800 MG/1
800 TABLET ORAL EVERY 8 HOURS
Qty: 60 TABLET | Refills: 2 | Status: SHIPPED | OUTPATIENT
Start: 2023-12-01

## 2023-12-01 RX ORDER — FERROUS SULFATE 325(65) MG
325 TABLET ORAL 2 TIMES DAILY WITH MEALS
Qty: 60 TABLET | Refills: 2 | Status: SHIPPED | OUTPATIENT
Start: 2023-12-01

## 2023-12-01 RX ORDER — OXYCODONE HYDROCHLORIDE 5 MG/1
5 TABLET ORAL EVERY 4 HOURS PRN
Qty: 20 TABLET | Refills: 0 | Status: SHIPPED | OUTPATIENT
Start: 2023-12-01 | End: 2023-12-08

## 2023-12-01 RX ORDER — ACETAMINOPHEN 500 MG
1000 TABLET ORAL EVERY 6 HOURS
Qty: 60 TABLET | Refills: 2 | Status: SHIPPED | OUTPATIENT
Start: 2023-12-01

## 2023-12-01 RX ORDER — NITROFURANTOIN 25; 75 MG/1; MG/1
100 CAPSULE ORAL 2 TIMES DAILY
Qty: 14 CAPSULE | Refills: 0 | Status: ACTIVE | OUTPATIENT
Start: 2023-12-01

## 2023-12-01 RX ORDER — PSEUDOEPHEDRINE HCL 30 MG
100 TABLET ORAL 2 TIMES DAILY PRN
Qty: 60 CAPSULE | Refills: 2 | Status: SHIPPED | OUTPATIENT
Start: 2023-12-01

## 2023-12-01 RX ADMIN — IBUPROFEN 800 MG: 800 TABLET, FILM COATED ORAL at 07:32

## 2023-12-01 RX ADMIN — PRENATAL WITH FERROUS FUM AND FOLIC ACID 1 TABLET: 3080; 920; 120; 400; 22; 1.84; 3; 20; 10; 1; 12; 200; 27; 25; 2 TABLET ORAL at 07:32

## 2023-12-01 RX ADMIN — ACETAMINOPHEN 1000 MG: 500 TABLET ORAL at 06:13

## 2023-12-01 ASSESSMENT — PAIN DESCRIPTION - PAIN TYPE
TYPE: ACUTE PAIN;SURGICAL PAIN
TYPE: ACUTE PAIN
TYPE: ACUTE PAIN
TYPE: ACUTE PAIN;SURGICAL PAIN
TYPE: ACUTE PAIN;SURGICAL PAIN
TYPE: ACUTE PAIN

## 2023-12-01 NOTE — CARE PLAN
The patient is Stable - Low risk of patient condition declining or worsening    Shift Goals  Clinical Goals: VSS, pumping, pain management  Patient Goals: visit infant in NICU  Family Goals: support.    Progress made toward(s) clinical / shift goals:    Problem: Knowledge Deficit - Standard  Goal: Patient and family/care givers will demonstrate understanding of plan of care, disease process/condition, diagnostic tests and medications  Outcome: Progressing     Problem: Knowledge Deficit - Postpartum  Goal: Patient will verbalize and demonstrate understanding of self and infant care  Outcome: Progressing     Problem: Psychosocial - Postpartum  Goal: Patient will verbalize and demonstrate effective bonding and parenting behavior  Outcome: Progressing     Problem: Altered Physiologic Condition  Goal: Patient physiologically stable as evidenced by normal lochia, palpable uterine involution and vitals within normal limits  Outcome: Progressing     Problem: Infection - Postpartum  Goal: Postpartum patient will be free of signs and symptoms of infection  Outcome: Progressing     Problem: Respiratory/Oxygenation Function Post-Surgical  Goal: Patient will achieve/maintain normal respiratory rate/effort  Outcome: Progressing     Problem: Bowel Elimination - Post Surgical  Goal: Patient will resume regular bowel sounds and function with no discomfort or distention  Outcome: Progressing     Problem: Early Mobilization - Post Surgery  Goal: Early mobilization post surgery  Outcome: Progressing     Problem: Pain - Standard  Goal: Alleviation of pain or a reduction in pain to the patient’s comfort goal  Outcome: Progressing       Patient is not progressing towards the following goals:

## 2023-12-01 NOTE — DISCHARGE PLANNING
Meds-to-Beds: Discharge prescription orders listed below delivered to patient's bedside. RN notified. Written information regarding the dispensed prescriptions was provided to the patient including the phone number of the pharmacy to call for any questions. Patient elected to have co-payment billed to patient account.      Current Outpatient Medications   Medication Sig Dispense Refill    acetaminophen (TYLENOL) 500 MG Tab Take 2 Tablets by mouth every 6 hours. 60 Tablet 2    docusate sodium 100 MG Cap Take 1 capsule by mouth 2 times a day as needed for Constipation. 60 Capsule 2    ibuprofen (MOTRIN) 800 MG Tab Take 1 Tablet by mouth every 8 hours. 60 Tablet 2    oxyCODONE immediate-release (ROXICODONE) 5 MG Tab Take 1 Tablet by mouth every four hours as needed for Severe Pain for up to 7 days. 20 Tablet 0    ferrous sulfate 325 (65 Fe) MG tablet Take 1 Tablet by mouth 2 times a day with meals. 60 Tablet 2    nitrofurantoin (MACROBID) 100 MG Cap Take 1 Capsule by mouth 2 times a day. 14 Capsule 0      Fartun York, PharmD

## 2023-12-01 NOTE — PROGRESS NOTES
Bedside report received from dayshift RN. 12hr chart check complete, MAR and orders reviewed. Pt awake and alert with family at bedside for support, denies pain or needs at this time, reports increased colostrum with pumping and pt encouraged to continue pumping Q3hrs throughout night to establish milk supply for infant in NICU. Call light within reach, pt will call if needs arise.

## 2023-12-01 NOTE — DISCHARGE INSTRUCTIONS

## 2023-12-01 NOTE — LACTATION NOTE
This note was copied from a baby's chart.  Follow up lactation support : Mom has been pumping more volume today. LC reviewed pumping plan Q 3 hours.   LC discussed pump supplies to be taken when discharging. Mom will rent a HG pump and will possibly staying at [facility].   LC provided mom with handouts for Collection and Storage and gave an Orange cooler for transport. LC discussed also that mom can pump in the NICU at the bedside and at the [facility].  FOB went out to purchase a pump for mother's personal use at home.  Mom is from [place] and has [employer] insurance.   All mother's questions were answered to mother's satisfaction.

## 2023-12-01 NOTE — DISCHARGE SUMMARY
Discharge Summary:      Beka Du    Admit Date:   2023  Discharge Date:  2023     Admitting diagnosis:  Indication for care in labor or delivery [O75.9]  Discharge Diagnosis: Status post  for fetal distress.  Pregnancy Complications: PPROM, GBS positive  Tubal Ligation:  no        History:  History reviewed. No pertinent past medical history.  OB History    Para Term  AB Living   1 1   1   1   SAB IAB Ectopic Molar Multiple Live Births           0 1      # Outcome Date GA Lbr Sly/2nd Weight Sex Delivery Anes PTL Lv   1  23 26w6d  1.098 kg (2 lb 6.7 oz) M CS-LTranv Spinal Y CORNELL        Patient has no known allergies.  Patient Active Problem List    Diagnosis Date Noted    Cervical funneling affecting pregnancy, antepartum, second trimester 2023    Positive GBS test 2023    Urinary tract infection affecting care of mother in second trimester, antepartum 2023     labor in second trimester without delivery 2023    Indication for care in labor or delivery 2023        Hospital Course:   23 y.o. , now para 1, was admitted with the above mentioned diagnosis, underwent Antepartum Admission,  for fetal distress. Patient postpartum course was unremarkable, with progressive advancement in diet , ambulation and toleration of oral analgesia. Patient without complaints today and desires discharge.      Vitals:    23 0015 23 0619 23 1850 23 0529   BP:  109/76 110/68 116/65   Pulse:  96 (!) 111 100   Resp:  18 18 18   Temp: 37.3 °C (99.2 °F) 36.8 °C (98.2 °F) 36.7 °C (98 °F) 36.7 °C (98 °F)   TempSrc: Temporal Temporal Temporal Temporal   SpO2:  97% 96% 96%   Weight:       Height:           Current Facility-Administered Medications   Medication Dose    simethicone (Mylicon) chewable tablet 125 mg  125 mg    lactated ringers infusion      acetaminophen (Tylenol) tablet 1,000 mg  1,000  mg    Followed by    [START ON 12/2/2023] acetaminophen (Tylenol) tablet 1,000 mg  1,000 mg    oxyCODONE immediate-release (Roxicodone) tablet 5 mg  5 mg    oxyCODONE immediate release (Roxicodone) tablet 10 mg  10 mg    docusate sodium (Colace) capsule 100 mg  100 mg    tetanus-dipth-acell pertussis (Tdap) inj 0.5 mL  0.5 mL    ibuprofen (Motrin) tablet 800 mg  800 mg    Followed by    ibuprofen (Motrin) tablet 800 mg  800 mg    diphenhydrAMINE (Benadryl) tablet/capsule 25 mg  25 mg    Or    diphenhydrAMINE (Benadryl) injection 25 mg  25 mg    ondansetron (Zofran ODT) dispertab 4 mg  4 mg    NIFEdipine IR (Procardia) capsule 10 mg  10 mg    prenatal plus vitamin (Stuartnatal 1+1) 27-1 MG tablet 1 Tablet  1 Tablet    acetaminophen (Tylenol) tablet 650 mg  650 mg       Exam:  Breast Exam: negative  Abdomen: Abdomen soft, non-tender. BS normal. No masses,  No organomegaly  Fundus Non Tender: yes  Incision: dry and intact  Perineum: perineum intact  Extremity: extremities, peripheral pulses and reflexes normal, no edema, redness or tenderness in the calves or thighs     Labs:  Recent Labs     11/29/23  0643   WBC 11.0*   RBC 3.09*   HEMOGLOBIN 9.4*   HEMATOCRIT 28.5*   MCV 92.2   MCH 30.4   MCHC 33.0   RDW 44.0   PLATELETCT 260   MPV 10.0        Activity:   Discharge to home  Pelvic Rest x 6 weeks    Assessment:  normal postpartum course  Discharge Assessment: No areas of skin breakdown/redness; surgical incision intact/healing     [facility]: [facility] or [facility] in 5 weeks for vaginal ; 1 week for incision check.   Declines birth control before d/c  Planning to d/c to [facility]  Will restart UTI treatment as only took two days worth of anbx prior to delivery      Discharge Meds:   Current Outpatient Medications   Medication Sig Dispense Refill    acetaminophen (TYLENOL) 500 MG Tab Take 2 Tablets by mouth every 6 hours. 60 Tablet 2    docusate sodium 100 MG Cap Take 100 mg by mouth 2 times a day as  needed for Constipation. 60 Capsule 2    ibuprofen (MOTRIN) 800 MG Tab Take 1 Tablet by mouth every 8 hours. 60 Tablet 2    oxyCODONE immediate-release (ROXICODONE) 5 MG Tab Take 1 Tablet by mouth every four hours as needed for Severe Pain for up to 7 days. 20 Tablet 0       ARIEL Davis.

## 2023-12-01 NOTE — CARE PLAN
Problem: Altered Physiologic Condition  Goal: Patient physiologically stable as evidenced by normal lochia, palpable uterine involution and vitals within normal limits  Outcome: Progressing     Problem: Infection - Postpartum  Goal: Postpartum patient will be free of signs and symptoms of infection  Outcome: Progressing     Problem: Pain - Standard  Goal: Alleviation of pain or a reduction in pain to the patient’s comfort goal  Outcome: Progressing     The patient is Stable - Low risk of patient condition declining or worsening    Shift Goals  Clinical Goals: pain control, no s/s infection  Patient Goals: visit infant in NICU  Family Goals: support.    Progress made toward(s) clinical / shift goals:  Pain well controlled with non-narcotic analgesia, pt is able to ambulate and care for self and visit infant in NICU without difficulty. Lochia remains scant without clots expressed, performing reji care and pad changes independently. No s/s infection noted on assessment, remains afebrile.    Patient is not progressing towards the following goals: NA

## 2023-12-01 NOTE — PROGRESS NOTES
Discharge instructions given to and well received by pt, verbal acknowledgement given, questions and answers offered, paperwork signed.    1300- Pt discharged via wheelchair in stable condition.

## 2023-12-08 ENCOUNTER — GYNECOLOGY VISIT (OUTPATIENT)
Dept: OBGYN | Facility: CLINIC | Age: 23
End: 2023-12-08
Payer: OTHER GOVERNMENT

## 2023-12-08 DIAGNOSIS — Z48.89 ENCOUNTER FOR POSTOPERATIVE CARE: ICD-10-CM

## 2023-12-08 PROBLEM — O60.02 PRETERM LABOR IN SECOND TRIMESTER WITHOUT DELIVERY: Status: RESOLVED | Noted: 2023-11-16 | Resolved: 2023-12-08

## 2023-12-08 PROCEDURE — 99024 POSTOP FOLLOW-UP VISIT: CPT | Performed by: OBSTETRICS & GYNECOLOGY

## 2023-12-08 NOTE — PROGRESS NOTES
Postoperative Follow Up Visit    Beka Du is a 23 y.o. female    Chief complaint    No chief complaint on file.      S/p 1LTCS on 11/28/23 for PPROM at 26 6/7, non-reassuring fetal heart tones presenting for postop check    COMPLAINTS:    Scant clear/light pink drainage from incision yesterday. No fevers. Occasional chills at night. No heavy vaginal bleeding. No constipation/diarrhea.  Voiding normally. Baby doing well in NICU.       OBJECTIVE:    There were no vitals taken for this visit.    General: No acute distress, well nourished, alert.     ABDOMEN: Soft nondistended, nontender, no peritoneal signs, no masses.     INCISION: Clean, dry, intact, no drainage, erythema or separation. Small area of dried clot at midline of incision. Mild appropriate tenderness diffusely at incision.     A/P    1. Encounter for postoperative care        S/p 1LTCS on 11/28/23 for PPROM at 26 6/7, non-reassuring fetal heart tones , doing well.     Postop precautions and care discussed.     Follow up in 4 weeks for final postop check.     Herbert Esparza M.D.    Obstetrics and Gynecology    12/8/20231:36 PM

## 2024-01-24 ENCOUNTER — LACTATION ENCOUNTER (OUTPATIENT)
Dept: OTHER | Facility: MEDICAL CENTER | Age: 24
End: 2024-01-24

## 2024-01-25 NOTE — LACTATION NOTE
This note was copied from a baby's chart.  LC visit for latch attempt, reviewed cross cradle and football positioning, reviewed hand expression of milk at breast. Infant showing cues and attempting latch, did not sustain more than a few sucks at a time, mother with improving independence after practice.

## 2024-05-02 ENCOUNTER — APPOINTMENT (OUTPATIENT)
Dept: OBGYN | Facility: CLINIC | Age: 24
End: 2024-05-02
Payer: OTHER GOVERNMENT

## 2024-05-02 VITALS — DIASTOLIC BLOOD PRESSURE: 64 MMHG | WEIGHT: 194 LBS | BODY MASS INDEX: 32.28 KG/M2 | SYSTOLIC BLOOD PRESSURE: 105 MMHG

## 2024-05-02 DIAGNOSIS — N92.0 MENORRHAGIA WITH REGULAR CYCLE: ICD-10-CM

## 2024-05-02 DIAGNOSIS — L76.82 INCISIONAL PAIN: ICD-10-CM

## 2024-05-02 PROCEDURE — 3074F SYST BP LT 130 MM HG: CPT | Performed by: OBSTETRICS & GYNECOLOGY

## 2024-05-02 PROCEDURE — 99214 OFFICE O/P EST MOD 30 MIN: CPT | Performed by: OBSTETRICS & GYNECOLOGY

## 2024-05-02 PROCEDURE — 3078F DIAST BP <80 MM HG: CPT | Performed by: OBSTETRICS & GYNECOLOGY

## 2024-05-02 ASSESSMENT — FIBROSIS 4 INDEX: FIB4 SCORE: 0.3

## 2024-05-02 NOTE — PROGRESS NOTES
Chief complaint;    Beka Du is a 23 y.o.  who presents complaining of heavier menstrual bleeding in the first 3 days of her cycle which typically last 4 to 5 days.  She does not have excessive cramps during her menstrual cycle.  Patient also presents complaining of an pain in the right side of her incision that is transient slight strange feeling in the incision not sharp or dull only last for short periods of time.  The patient denies intermenstrual bleeding.  Patient is pumping her infant was born premature and stayed in NICU for approximately 3 months currently home with mom patient is not using birth control and does not like hormones were IUDs per her history.  .    Review of systems; denies fever chills abdominal pain, denies chest pain shortness of breath or urinary symptoms  Past medical history-History reviewed. No pertinent past medical history.  Past surgical history-  Past Surgical History:   Procedure Laterality Date    PRIMARY C SECTION Bilateral 2023    Procedure:  SECTION, PRIMARY;  Surgeon: Kevin Christian M.D.;  Location: [facility];  Service: Obstetrics     Allergies-Patient has no known allergies.  Medications-  Current Outpatient Medications on File Prior to Visit   Medication Sig Dispense Refill    acetaminophen (TYLENOL) 500 MG Tab Take 2 Tablets by mouth every 6 hours. 60 Tablet 2    docusate sodium 100 MG Cap Take 1 capsule by mouth 2 times a day as needed for Constipation. 60 Capsule 2    ibuprofen (MOTRIN) 800 MG Tab Take 1 Tablet by mouth every 8 hours. 60 Tablet 2    ferrous sulfate 325 (65 Fe) MG tablet Take 1 Tablet by mouth 2 times a day with meals. 60 Tablet 2    nitrofurantoin (MACROBID) 100 MG Cap Take 1 Capsule by mouth 2 times a day. 14 Capsule 0    Prenatal MV-Min-Fe Fum-FA-DHA (PRENATAL 1 PO) Take 1 Tablet by mouth every day.       No current facility-administered medications on file prior to visit.     Social  history-  Social History     Socioeconomic History    Marital status: Single     Spouse name: Not on file    Number of children: Not on file    Years of education: Not on file    Highest education level: Not on file   Occupational History    Not on file   Tobacco Use    Smoking status: Never    Smokeless tobacco: Never   Vaping Use    Vaping Use: Never used   Substance and Sexual Activity    Alcohol use: Never    Drug use: Never    Sexual activity: Not on file   Other Topics Concern    Not on file   Social History Narrative    Not on file     Social Determinants of Health     Financial Resource Strain: Not on file   Food Insecurity: Not on file   Transportation Needs: Not on file   Physical Activity: Not on file   Stress: Not on file   Social Connections: Not on file   Intimate Partner Violence: Not on file   Housing Stability: Not on file     Past Family History-no history of breast or ovarian cancer    Physical examination;  Alert and oriented x3  General a thin well-developed well-nourished female in no apparent distress  Vitals:    05/02/24 1128   BP: 105/64   BP Location: Right arm   Patient Position: Sitting   BP Cuff Size: Small adult   Weight: 194 lb     Skin is warm and dry  Neck-supple  HEENT-head-atraumatic, normocephalic, EOMI, PERRLA  Cardiovascular-regular rate and rhythm, normal S1-S2, no murmurs or gallops  Lungs-clear to auscultation bilaterally  Back-negative CVA tenderness  Abdomen-incision well-healed nontender nondistended positive bowel sounds soft nontender no masses or hepatosplenomegaly  Pelvic examination;  External genitalia-no visible lesions   Vagina-no blood or discharge  Cervix-no gross lesions  Uterus-normal size and shape, nontender  Adnexa without mass or tenderness  Extremities without cyanosis clubbing or edema  Neurologic exam grossly intact    Impression;  Menorrhagia  Incisional pain-no abnormalities in incision    Plan;  Discussed the best way to decrease her menstrual flow  would be to use Depo-Provera or Mirena IUD but patient declines presently.  Will try nonsteroidal anti-inflammatory drugs 800 mg ibuprofen every 8 hours x 3 days for the first 3 days of her menstrual cycle for at least 2 to 3 months if this does not work patient will come back to the office and we will discuss her other options      30  Minutes spent with the patient in face-to-face contact, greater than 50% of the time spent on counseling and coordination of care. All questions answered in detail.    Female chaperone present for entire examination and history

## 2025-04-28 NOTE — Clinical Note
REFERRAL APPROVAL NOTICE         Sent on April 28, 2025                   Beka Du  [address]  [facility] [address]                   Dear Ms. Lillian Du,    After a careful review of the medical information and benefit coverage, [facility] has processed your referral. See below for additional details.    If applicable, you must be actively enrolled with your insurance for coverage of the authorized service. If you have any questions regarding your coverage, please contact your insurance directly.    REFERRAL INFORMATION   Referral #:  [id number]  Referred-To Department    Referred-By Provider:  Maternal Fetal Medicine    CORTES ESQUIVEL D.O.   Maternal Fetal Med      [address]  [facility] [address]  [facility] [address]  [phone] [address], [address]  [facility] [id number] [phone]    Referral Start Date:  04/28/2025  Referral End Date:   06/30/2025             SCHEDULING  If you do not already have an appointment, please call [phone] to make an appointment.     MORE INFORMATION  If you do not already have a efw-suhl account, sign up at: [email or web address]  You can access your medical information, make appointments, see lab results, billing information, and more.  If you have questions regarding this referral, please contact  the [facility] Referrals department at:             [phone]. Monday - Friday 8:00AM - 5:00PM.     Sincerely,    [facility]

## 2025-05-27 ENCOUNTER — ANCILLARY PROCEDURE (OUTPATIENT)
Dept: MATERNAL FETAL MEDICINE | Facility: MEDICAL CENTER | Age: 25
End: 2025-05-27
Attending: OBSTETRICS & GYNECOLOGY
Payer: OTHER GOVERNMENT

## 2025-05-27 VITALS
DIASTOLIC BLOOD PRESSURE: 70 MMHG | BODY MASS INDEX: 32.53 KG/M2 | WEIGHT: 195.5 LBS | HEART RATE: 112 BPM | SYSTOLIC BLOOD PRESSURE: 108 MMHG

## 2025-05-27 DIAGNOSIS — O09.291 HISTORY OF PRIOR PREGNANCY WITH SHORT CERVIX, CURRENTLY PREGNANT IN FIRST TRIMESTER: ICD-10-CM

## 2025-05-27 ASSESSMENT — FIBROSIS 4 INDEX: FIB4 SCORE: 0.31

## 2025-06-26 ENCOUNTER — APPOINTMENT (OUTPATIENT)
Dept: MATERNAL FETAL MEDICINE | Facility: MEDICAL CENTER | Age: 25
End: 2025-06-26
Payer: OTHER GOVERNMENT

## 2025-06-26 VITALS
HEART RATE: 112 BPM | WEIGHT: 199.9 LBS | DIASTOLIC BLOOD PRESSURE: 72 MMHG | SYSTOLIC BLOOD PRESSURE: 115 MMHG | BODY MASS INDEX: 33.27 KG/M2

## 2025-06-26 DIAGNOSIS — O09.299 HISTORY OF PRIOR PREGNANCY WITH SHORT CERVIX, CURRENTLY PREGNANT: ICD-10-CM

## 2025-06-26 PROCEDURE — 76817 TRANSVAGINAL US OBSTETRIC: CPT | Performed by: OBSTETRICS & GYNECOLOGY

## 2025-06-26 ASSESSMENT — FIBROSIS 4 INDEX: FIB4 SCORE: 0.31

## 2025-07-24 ENCOUNTER — APPOINTMENT (OUTPATIENT)
Dept: MATERNAL FETAL MEDICINE | Facility: MEDICAL CENTER | Age: 25
End: 2025-07-24
Payer: OTHER GOVERNMENT

## 2025-07-24 VITALS
SYSTOLIC BLOOD PRESSURE: 93 MMHG | WEIGHT: 201 LBS | DIASTOLIC BLOOD PRESSURE: 53 MMHG | BODY MASS INDEX: 33.45 KG/M2 | HEART RATE: 78 BPM

## 2025-07-24 DIAGNOSIS — O09.299 HISTORY OF PRIOR PREGNANCY WITH SHORT CERVIX, CURRENTLY PREGNANT: ICD-10-CM

## 2025-07-24 PROCEDURE — 76811 OB US DETAILED SNGL FETUS: CPT | Performed by: OBSTETRICS & GYNECOLOGY

## 2025-07-24 PROCEDURE — 76817 TRANSVAGINAL US OBSTETRIC: CPT | Performed by: OBSTETRICS & GYNECOLOGY

## 2025-07-24 ASSESSMENT — FIBROSIS 4 INDEX: FIB4 SCORE: 0.31

## 2025-07-25 ENCOUNTER — HOSPITAL ENCOUNTER (OUTPATIENT)
Facility: MEDICAL CENTER | Age: 25
End: 2025-07-25
Attending: STUDENT IN AN ORGANIZED HEALTH CARE EDUCATION/TRAINING PROGRAM | Admitting: STUDENT IN AN ORGANIZED HEALTH CARE EDUCATION/TRAINING PROGRAM
Payer: OTHER GOVERNMENT

## 2025-07-25 ENCOUNTER — ANESTHESIA (OUTPATIENT)
Dept: OBGYN | Facility: MEDICAL CENTER | Age: 25
End: 2025-07-25
Payer: OTHER GOVERNMENT

## 2025-07-25 ENCOUNTER — ANESTHESIA EVENT (OUTPATIENT)
Dept: OBGYN | Facility: MEDICAL CENTER | Age: 25
End: 2025-07-25
Payer: OTHER GOVERNMENT

## 2025-07-25 VITALS
OXYGEN SATURATION: 98 % | DIASTOLIC BLOOD PRESSURE: 55 MMHG | HEIGHT: 65 IN | RESPIRATION RATE: 16 BRPM | SYSTOLIC BLOOD PRESSURE: 101 MMHG | HEART RATE: 75 BPM | TEMPERATURE: 97.6 F | BODY MASS INDEX: 33.32 KG/M2 | WEIGHT: 200 LBS

## 2025-07-25 LAB
APPEARANCE UR: CLEAR
BASOPHILS # BLD AUTO: 0.3 % (ref 0–1.8)
BASOPHILS # BLD: 0.03 K/UL (ref 0–0.12)
BILIRUB UR QL STRIP.AUTO: NEGATIVE
COLOR UR: YELLOW
EOSINOPHIL # BLD AUTO: 0.04 K/UL (ref 0–0.51)
EOSINOPHIL NFR BLD: 0.4 % (ref 0–6.9)
ERYTHROCYTE [DISTWIDTH] IN BLOOD BY AUTOMATED COUNT: 43.2 FL (ref 35.9–50)
GLUCOSE UR STRIP.AUTO-MCNC: NEGATIVE MG/DL
HCT VFR BLD AUTO: 36.7 % (ref 37–47)
HGB BLD-MCNC: 12.6 G/DL (ref 12–16)
IMM GRANULOCYTES # BLD AUTO: 0.09 K/UL (ref 0–0.11)
IMM GRANULOCYTES NFR BLD AUTO: 0.8 % (ref 0–0.9)
KETONES UR STRIP.AUTO-MCNC: 80 MG/DL
LEUKOCYTE ESTERASE UR QL STRIP.AUTO: NEGATIVE
LYMPHOCYTES # BLD AUTO: 2.5 K/UL (ref 1–4.8)
LYMPHOCYTES NFR BLD: 21.9 % (ref 22–41)
MCH RBC QN AUTO: 30.5 PG (ref 27–33)
MCHC RBC AUTO-ENTMCNC: 34.3 G/DL (ref 32.2–35.5)
MCV RBC AUTO: 88.9 FL (ref 81.4–97.8)
MICRO URNS: ABNORMAL
MONOCYTES # BLD AUTO: 0.66 K/UL (ref 0–0.85)
MONOCYTES NFR BLD AUTO: 5.8 % (ref 0–13.4)
NEUTROPHILS # BLD AUTO: 8.07 K/UL (ref 1.82–7.42)
NEUTROPHILS NFR BLD: 70.8 % (ref 44–72)
NITRITE UR QL STRIP.AUTO: NEGATIVE
NRBC # BLD AUTO: 0 K/UL
NRBC BLD-RTO: 0 /100 WBC (ref 0–0.2)
PH UR STRIP.AUTO: 7 [PH] (ref 5–8)
PLATELET # BLD AUTO: 281 K/UL (ref 164–446)
PMV BLD AUTO: 11 FL (ref 9–12.9)
PROT UR QL STRIP: NEGATIVE MG/DL
RBC # BLD AUTO: 4.13 M/UL (ref 4.2–5.4)
RBC UR QL AUTO: NEGATIVE
SP GR UR STRIP.AUTO: 1.02
UROBILINOGEN UR STRIP.AUTO-MCNC: 0.2 EU/DL
WBC # BLD AUTO: 11.4 K/UL (ref 4.8–10.8)

## 2025-07-25 PROCEDURE — A9270 NON-COVERED ITEM OR SERVICE: HCPCS | Performed by: STUDENT IN AN ORGANIZED HEALTH CARE EDUCATION/TRAINING PROGRAM

## 2025-07-25 PROCEDURE — 160015 HCHG STAT PREOP MINUTES: Performed by: OBSTETRICS & GYNECOLOGY

## 2025-07-25 PROCEDURE — 700111 HCHG RX REV CODE 636 W/ 250 OVERRIDE (IP): Mod: JZ | Performed by: OBSTETRICS & GYNECOLOGY

## 2025-07-25 PROCEDURE — 160193 HCHG PACU STANDARD - 1ST 60 MINS: Performed by: OBSTETRICS & GYNECOLOGY

## 2025-07-25 PROCEDURE — 160048 HCHG OR STATISTICAL LEVEL 1-5: Performed by: OBSTETRICS & GYNECOLOGY

## 2025-07-25 PROCEDURE — 36415 COLL VENOUS BLD VENIPUNCTURE: CPT

## 2025-07-25 PROCEDURE — 85025 COMPLETE CBC W/AUTO DIFF WBC: CPT

## 2025-07-25 PROCEDURE — 700105 HCHG RX REV CODE 258: Performed by: STUDENT IN AN ORGANIZED HEALTH CARE EDUCATION/TRAINING PROGRAM

## 2025-07-25 PROCEDURE — 700111 HCHG RX REV CODE 636 W/ 250 OVERRIDE (IP): Mod: JZ | Performed by: STUDENT IN AN ORGANIZED HEALTH CARE EDUCATION/TRAINING PROGRAM

## 2025-07-25 PROCEDURE — 160191 HCHG ANESTHESIA STANDARD: Performed by: OBSTETRICS & GYNECOLOGY

## 2025-07-25 PROCEDURE — 160039 HCHG SURGERY MINUTES - EA ADDL 1 MIN LEVEL 3: Performed by: OBSTETRICS & GYNECOLOGY

## 2025-07-25 PROCEDURE — 96374 THER/PROPH/DIAG INJ IV PUSH: CPT | Mod: XU

## 2025-07-25 PROCEDURE — 700102 HCHG RX REV CODE 250 W/ 637 OVERRIDE(OP): Performed by: STUDENT IN AN ORGANIZED HEALTH CARE EDUCATION/TRAINING PROGRAM

## 2025-07-25 PROCEDURE — 96375 TX/PRO/DX INJ NEW DRUG ADDON: CPT | Mod: XU

## 2025-07-25 PROCEDURE — 81003 URINALYSIS AUTO W/O SCOPE: CPT

## 2025-07-25 PROCEDURE — 160028 HCHG SURGERY MINUTES - 1ST 30 MINS LEVEL 3: Performed by: OBSTETRICS & GYNECOLOGY

## 2025-07-25 PROCEDURE — 160002 HCHG RECOVERY MINUTES (STAT): Performed by: OBSTETRICS & GYNECOLOGY

## 2025-07-25 PROCEDURE — C1755 CATHETER, INTRASPINAL: HCPCS | Performed by: OBSTETRICS & GYNECOLOGY

## 2025-07-25 RX ORDER — METOPROLOL TARTRATE 1 MG/ML
1 INJECTION, SOLUTION INTRAVENOUS
Status: DISCONTINUED | OUTPATIENT
Start: 2025-07-25 | End: 2025-07-25 | Stop reason: HOSPADM

## 2025-07-25 RX ORDER — METOCLOPRAMIDE HYDROCHLORIDE 5 MG/ML
10 INJECTION INTRAMUSCULAR; INTRAVENOUS ONCE
Status: COMPLETED | OUTPATIENT
Start: 2025-07-25 | End: 2025-07-25

## 2025-07-25 RX ORDER — HYDROMORPHONE HYDROCHLORIDE 1 MG/ML
0.4 INJECTION, SOLUTION INTRAMUSCULAR; INTRAVENOUS; SUBCUTANEOUS
Status: DISCONTINUED | OUTPATIENT
Start: 2025-07-25 | End: 2025-07-25

## 2025-07-25 RX ORDER — HYDROMORPHONE HYDROCHLORIDE 1 MG/ML
0.2 INJECTION, SOLUTION INTRAMUSCULAR; INTRAVENOUS; SUBCUTANEOUS
Status: DISCONTINUED | OUTPATIENT
Start: 2025-07-25 | End: 2025-07-25

## 2025-07-25 RX ORDER — OXYCODONE HYDROCHLORIDE 5 MG/1
5 TABLET ORAL EVERY 4 HOURS PRN
Status: DISCONTINUED | OUTPATIENT
Start: 2025-07-25 | End: 2025-07-25

## 2025-07-25 RX ORDER — HYDRALAZINE HYDROCHLORIDE 20 MG/ML
5 INJECTION INTRAMUSCULAR; INTRAVENOUS
Status: DISCONTINUED | OUTPATIENT
Start: 2025-07-25 | End: 2025-07-25 | Stop reason: HOSPADM

## 2025-07-25 RX ORDER — HYDROMORPHONE HYDROCHLORIDE 1 MG/ML
0.1 INJECTION, SOLUTION INTRAMUSCULAR; INTRAVENOUS; SUBCUTANEOUS
Status: DISCONTINUED | OUTPATIENT
Start: 2025-07-25 | End: 2025-07-25

## 2025-07-25 RX ORDER — DIPHENHYDRAMINE HYDROCHLORIDE 50 MG/ML
12.5 INJECTION, SOLUTION INTRAMUSCULAR; INTRAVENOUS
Status: DISCONTINUED | OUTPATIENT
Start: 2025-07-25 | End: 2025-07-25 | Stop reason: HOSPADM

## 2025-07-25 RX ORDER — BUPIVACAINE HYDROCHLORIDE 7.5 MG/ML
INJECTION, SOLUTION INTRASPINAL
Status: COMPLETED | OUTPATIENT
Start: 2025-07-25 | End: 2025-07-25

## 2025-07-25 RX ORDER — EPHEDRINE SULFATE 50 MG/ML
10 INJECTION, SOLUTION INTRAVENOUS
Status: DISCONTINUED | OUTPATIENT
Start: 2025-07-25 | End: 2025-07-25 | Stop reason: HOSPADM

## 2025-07-25 RX ORDER — KETOROLAC TROMETHAMINE 15 MG/ML
15 INJECTION, SOLUTION INTRAMUSCULAR; INTRAVENOUS EVERY 6 HOURS
Status: DISCONTINUED | OUTPATIENT
Start: 2025-07-25 | End: 2025-07-25

## 2025-07-25 RX ORDER — ONDANSETRON 2 MG/ML
4 INJECTION INTRAMUSCULAR; INTRAVENOUS EVERY 6 HOURS PRN
Status: DISCONTINUED | OUTPATIENT
Start: 2025-07-25 | End: 2025-07-25 | Stop reason: HOSPADM

## 2025-07-25 RX ORDER — OXYCODONE HCL 5 MG/5 ML
5 SOLUTION, ORAL ORAL
Status: DISCONTINUED | OUTPATIENT
Start: 2025-07-25 | End: 2025-07-25

## 2025-07-25 RX ORDER — HALOPERIDOL 5 MG/ML
1 INJECTION INTRAMUSCULAR
Status: DISCONTINUED | OUTPATIENT
Start: 2025-07-25 | End: 2025-07-25 | Stop reason: HOSPADM

## 2025-07-25 RX ORDER — SODIUM CHLORIDE, SODIUM GLUCONATE, SODIUM ACETATE, POTASSIUM CHLORIDE AND MAGNESIUM CHLORIDE 526; 502; 368; 37; 30 MG/100ML; MG/100ML; MG/100ML; MG/100ML; MG/100ML
1000 INJECTION, SOLUTION INTRAVENOUS ONCE
Status: COMPLETED | OUTPATIENT
Start: 2025-07-25 | End: 2025-07-25

## 2025-07-25 RX ORDER — KETOROLAC TROMETHAMINE 15 MG/ML
15 INJECTION, SOLUTION INTRAMUSCULAR; INTRAVENOUS ONCE
Status: COMPLETED | OUTPATIENT
Start: 2025-07-25 | End: 2025-07-25

## 2025-07-25 RX ORDER — SODIUM CHLORIDE, SODIUM LACTATE, POTASSIUM CHLORIDE, CALCIUM CHLORIDE 600; 310; 30; 20 MG/100ML; MG/100ML; MG/100ML; MG/100ML
INJECTION, SOLUTION INTRAVENOUS CONTINUOUS
Status: DISCONTINUED | OUTPATIENT
Start: 2025-07-25 | End: 2025-07-25 | Stop reason: HOSPADM

## 2025-07-25 RX ORDER — ONDANSETRON 2 MG/ML
INJECTION INTRAMUSCULAR; INTRAVENOUS PRN
Status: DISCONTINUED | OUTPATIENT
Start: 2025-07-25 | End: 2025-07-25 | Stop reason: SURG

## 2025-07-25 RX ORDER — OXYCODONE HYDROCHLORIDE 10 MG/1
10 TABLET ORAL EVERY 4 HOURS PRN
Status: DISCONTINUED | OUTPATIENT
Start: 2025-07-25 | End: 2025-07-25

## 2025-07-25 RX ORDER — EPHEDRINE SULFATE 50 MG/ML
5 INJECTION, SOLUTION INTRAVENOUS
Status: DISCONTINUED | OUTPATIENT
Start: 2025-07-25 | End: 2025-07-25 | Stop reason: HOSPADM

## 2025-07-25 RX ORDER — DIPHENHYDRAMINE HYDROCHLORIDE 50 MG/ML
12.5 INJECTION, SOLUTION INTRAMUSCULAR; INTRAVENOUS EVERY 6 HOURS PRN
Status: DISCONTINUED | OUTPATIENT
Start: 2025-07-25 | End: 2025-07-25

## 2025-07-25 RX ORDER — DEXAMETHASONE SODIUM PHOSPHATE 4 MG/ML
INJECTION, SOLUTION INTRA-ARTICULAR; INTRALESIONAL; INTRAMUSCULAR; INTRAVENOUS; SOFT TISSUE PRN
Status: DISCONTINUED | OUTPATIENT
Start: 2025-07-25 | End: 2025-07-25 | Stop reason: SURG

## 2025-07-25 RX ORDER — MEPERIDINE HYDROCHLORIDE 50 MG/ML
6.25 INJECTION INTRAMUSCULAR; INTRAVENOUS; SUBCUTANEOUS
Status: DISCONTINUED | OUTPATIENT
Start: 2025-07-25 | End: 2025-07-25 | Stop reason: HOSPADM

## 2025-07-25 RX ORDER — LABETALOL HYDROCHLORIDE 5 MG/ML
5 INJECTION, SOLUTION INTRAVENOUS
Status: DISCONTINUED | OUTPATIENT
Start: 2025-07-25 | End: 2025-07-25 | Stop reason: HOSPADM

## 2025-07-25 RX ORDER — OXYCODONE HCL 5 MG/5 ML
10 SOLUTION, ORAL ORAL
Status: DISCONTINUED | OUTPATIENT
Start: 2025-07-25 | End: 2025-07-25

## 2025-07-25 RX ORDER — CITRIC ACID/SODIUM CITRATE 334-500MG
30 SOLUTION, ORAL ORAL ONCE
Status: COMPLETED | OUTPATIENT
Start: 2025-07-25 | End: 2025-07-25

## 2025-07-25 RX ORDER — DIPHENHYDRAMINE HYDROCHLORIDE 50 MG/ML
25 INJECTION, SOLUTION INTRAMUSCULAR; INTRAVENOUS EVERY 6 HOURS PRN
Status: DISCONTINUED | OUTPATIENT
Start: 2025-07-25 | End: 2025-07-25

## 2025-07-25 RX ORDER — ONDANSETRON 2 MG/ML
4 INJECTION INTRAMUSCULAR; INTRAVENOUS
Status: DISCONTINUED | OUTPATIENT
Start: 2025-07-25 | End: 2025-07-25 | Stop reason: HOSPADM

## 2025-07-25 RX ORDER — ACETAMINOPHEN 500 MG
1000 TABLET ORAL EVERY 6 HOURS
Status: DISCONTINUED | OUTPATIENT
Start: 2025-07-25 | End: 2025-07-25

## 2025-07-25 RX ORDER — ALBUTEROL SULFATE 5 MG/ML
2.5 SOLUTION RESPIRATORY (INHALATION)
Status: DISCONTINUED | OUTPATIENT
Start: 2025-07-25 | End: 2025-07-25 | Stop reason: HOSPADM

## 2025-07-25 RX ORDER — SODIUM CHLORIDE, SODIUM GLUCONATE, SODIUM ACETATE, POTASSIUM CHLORIDE AND MAGNESIUM CHLORIDE 526; 502; 368; 37; 30 MG/100ML; MG/100ML; MG/100ML; MG/100ML; MG/100ML
INJECTION, SOLUTION INTRAVENOUS
Status: DISCONTINUED | OUTPATIENT
Start: 2025-07-25 | End: 2025-07-25 | Stop reason: SURG

## 2025-07-25 RX ORDER — OMEPRAZOLE 40 MG/1
CAPSULE, DELAYED RELEASE ORAL
COMMUNITY
Start: 2025-06-18

## 2025-07-25 RX ADMIN — DEXAMETHASONE SODIUM PHOSPHATE 4 MG: 4 INJECTION INTRA-ARTICULAR; INTRALESIONAL; INTRAMUSCULAR; INTRAVENOUS; SOFT TISSUE at 16:34

## 2025-07-25 RX ADMIN — ONDANSETRON 4 MG: 2 INJECTION INTRAMUSCULAR; INTRAVENOUS at 16:29

## 2025-07-25 RX ADMIN — SODIUM CITRATE AND CITRIC ACID MONOHYDRATE 30 ML: 2004; 3000 SOLUTION ORAL at 16:08

## 2025-07-25 RX ADMIN — SODIUM CHLORIDE, SODIUM GLUCONATE, SODIUM ACETATE, POTASSIUM CHLORIDE AND MAGNESIUM CHLORIDE 1000 ML: 526; 502; 368; 37; 30 INJECTION, SOLUTION INTRAVENOUS at 16:08

## 2025-07-25 RX ADMIN — METOCLOPRAMIDE HYDROCHLORIDE 10 MG: 5 INJECTION INTRAMUSCULAR; INTRAVENOUS at 16:07

## 2025-07-25 RX ADMIN — KETOROLAC TROMETHAMINE 15 MG: 15 INJECTION, SOLUTION INTRAMUSCULAR; INTRAVENOUS at 17:13

## 2025-07-25 RX ADMIN — FAMOTIDINE 20 MG: 10 INJECTION, SOLUTION INTRAVENOUS at 16:07

## 2025-07-25 RX ADMIN — BUPIVACAINE HYDROCHLORIDE IN DEXTROSE 1.2 ML: 7.5 INJECTION, SOLUTION SUBARACHNOID at 16:32

## 2025-07-25 RX ADMIN — SODIUM CHLORIDE, SODIUM GLUCONATE, SODIUM ACETATE, POTASSIUM CHLORIDE AND MAGNESIUM CHLORIDE: 526; 502; 368; 37; 30 INJECTION, SOLUTION INTRAVENOUS at 16:19

## 2025-07-25 SDOH — ECONOMIC STABILITY: TRANSPORTATION INSECURITY
IN THE PAST 12 MONTHS, HAS LACK OF RELIABLE TRANSPORTATION KEPT YOU FROM MEDICAL APPOINTMENTS, MEETINGS, WORK OR FROM GETTING THINGS NEEDED FOR DAILY LIVING?: PATIENT DECLINED

## 2025-07-25 SDOH — ECONOMIC STABILITY: TRANSPORTATION INSECURITY
IN THE PAST 12 MONTHS, HAS THE LACK OF TRANSPORTATION KEPT YOU FROM MEDICAL APPOINTMENTS OR FROM GETTING MEDICATIONS?: PATIENT DECLINED

## 2025-07-25 ASSESSMENT — SOCIAL DETERMINANTS OF HEALTH (SDOH)
WITHIN THE LAST YEAR, HAVE YOU BEEN AFRAID OF YOUR PARTNER OR EX-PARTNER?: NO
IN THE PAST 12 MONTHS, HAS THE ELECTRIC, GAS, OIL, OR WATER COMPANY THREATENED TO SHUT OFF SERVICE IN YOUR HOME?: PATIENT DECLINED
WITHIN THE LAST YEAR, HAVE TO BEEN RAPED OR FORCED TO HAVE ANY KIND OF SEXUAL ACTIVITY BY YOUR PARTNER OR EX-PARTNER?: NO
WITHIN THE PAST 12 MONTHS, YOU WORRIED THAT YOUR FOOD WOULD RUN OUT BEFORE YOU GOT THE MONEY TO BUY MORE: PATIENT DECLINED
WITHIN THE LAST YEAR, HAVE YOU BEEN HUMILIATED OR EMOTIONALLY ABUSED IN OTHER WAYS BY YOUR PARTNER OR EX-PARTNER?: NO
WITHIN THE PAST 12 MONTHS, THE FOOD YOU BOUGHT JUST DIDN'T LAST AND YOU DIDN'T HAVE MONEY TO GET MORE: PATIENT DECLINED
WITHIN THE LAST YEAR, HAVE YOU BEEN KICKED, HIT, SLAPPED, OR OTHERWISE PHYSICALLY HURT BY YOUR PARTNER OR EX-PARTNER?: NO

## 2025-07-25 ASSESSMENT — FIBROSIS 4 INDEX: FIB4 SCORE: 0.31

## 2025-07-25 ASSESSMENT — PAIN SCALES - GENERAL: PAINLEVEL: 0 - NO PAIN

## 2025-07-25 NOTE — ANESTHESIA PROCEDURE NOTES
Spinal Block    Date/Time: 7/25/2025 4:32 PM    Performed by: Arturo Novoa D.O.  Authorized by: Arturo Novoa D.O.    Patient Location:  OB  Start Time:  7/25/2025 4:32 PM  Reason for Block: primary anesthetic    patient identified, IV checked, site marked, risks and benefits discussed, surgical consent, monitors and equipment checked, pre-op evaluation and timeout performed    Patient Position:  Sitting  Prep: ChloraPrep, patient draped and sterile technique    Monitoring:  Blood pressure, continuous pulse oximetry and heart rate  Approach:  Midline  Location:  L4-5  Injection Technique:  Single-shot  Skin infiltration:  Lidocaine  Strength:  1%  Dose:  3ml  Needle Type:  Abhi  Needle Gauge:  25 G  CSF flowing pre/post injection:  Yes  Sensory Level:  T8   1 attempt, no apparent complications

## 2025-07-25 NOTE — H&P
History and Physical Exam    Cervical funneling      History of present illness: 24 y.o. has a history of cervical incompetence. Risks, benefits and alternative therapies have been discussed and patient still desires  a cerclage. Questions answered.    Pertinent positives documented in HPI and all other systems reviewed & are negative    All PMH, PSH, allergies, social history and FH reviewed and updated today:  Past Medical History[1]    Past Surgical History[2]    Scheduled Medications[3]    Allergies: Allergies[4]    Social History     Socioeconomic History    Marital status: Single     Spouse name: Not on file    Number of children: Not on file    Years of education: Not on file    Highest education level: Not on file   Occupational History    Not on file   Tobacco Use    Smoking status: Never    Smokeless tobacco: Never   Vaping Use    Vaping status: Never Used   Substance and Sexual Activity    Alcohol use: Never    Drug use: Never    Sexual activity: Yes     Partners: Male   Other Topics Concern    Not on file   Social History Narrative    Not on file     Social Drivers of Health     Financial Resource Strain: Not on file   Food Insecurity: Not on file   Transportation Needs: Not on file   Physical Activity: Not on file   Stress: Not on file   Social Connections: Not on file   Intimate Partner Violence: Not on file   Housing Stability: Not on file       No family history on file.    Physical exam:  There were no vitals taken for this visit.    GENERAL APPEARANCE: healthy  NECK nontender, no masses, thyromegaly or nodules  HEART RRR with normal S1 and S2 ,no murmurs, no gallops, no peripheral edema  LUNG clear to auscultation, normal respiratory effort  ABDOMEN Abdomen soft, non-tender. BS normal. No masses,  No organomegaly  EXTREMITIES:negative clubbing, cyanosis, edema    NEURO Awake, alert and oriented x 3, Normal gait, no sensory deficits  SKIN No rashes, or ulcers or lesions seen  PSYCHIATRIC: Patient  shows appropriate affect, is alert and oriented x3, intact judgment and insight.        Cervical Incompetence  IUP 20w5d      Beka Du is a 24 y.o.  female with cervical incompetence who presents for admission.    Specific risks include but are not limited to pain, infection, bleeding, blood transfusion, rupture membanes and anesthesia risks.    After discussion of risks and benefits, all questions regarding procedure were answered for patient. Consents were signed.    Postoperative course discussed with patient. Patient should return to office or emergency room for   #1 fever greater than 101,   #2 heavy vaginal bleeding soaking greater than one pad per hour,   #3 uncontrolled pain,   #4 inability to tolerate regular diet pass gas or moved bowels.    Follow up in 2 weeks after surgery for postop check.     Spent 30 mins with the patient with over 50% of the time discussing the procedure, risk and benefits and obtaining consent.          [1] No past medical history on file.  [2]   Past Surgical History:  Procedure Laterality Date    PRIMARY C SECTION Bilateral 2023    Procedure:  SECTION, PRIMARY;  Surgeon: Kevin Christian M.D.;  Location: [facility];  Service: Obstetrics   [3]   No current facility-administered medications for this encounter.   [4] No Known Allergies

## 2025-07-25 NOTE — ANESTHESIA PREPROCEDURE EVALUATION
Case: [id number] Date/Time: 07/25/25 1630    Procedure: CERCLAGE, CERVIX (Vagina )    Anesthesia type: Spinal    Pre-op diagnosis: cervical funneling    Location: [facility] OR 03 / [facility]    Surgeons: BEATRIZ Yanes H&P:  PAST MEDICAL HISTORY:   24 y.o. female who presents for Procedure(s) (LRB):  CERCLAGE, CERVIX (N/A).  She has current and past medical problems significant for:    Past Medical History[1]    SMOKING/ALCOHOL/RECREATIONAL DRUG USE:  Social History[2]  Social History     Substance and Sexual Activity   Drug Use Never       PAST SURGICAL HISTORY:  Past Surgical History[3]    ALLERGIES:   Allergies[4]    MEDICATIONS:  Medications Ordered Prior to Encounter[5]    LABS:  Lab Results   Component Value Date/Time    HEMOGLOBIN 12.6 07/25/2025 1330    HEMATOCRIT 36.7 (L) 07/25/2025 1330    WBC 11.4 (H) 07/25/2025 1330     Lab Results   Component Value Date/Time    SODIUM 134 (L) 11/18/2023 0458    POTASSIUM 3.6 11/18/2023 0458    CHLORIDE 102 11/18/2023 0458    CO2 22 11/18/2023 0458    GLUCOSE 95 11/18/2023 0458    BUN 6 (L) 11/18/2023 0458    CALCIUM 8.2 (L) 11/18/2023 0458         PREVIOUS ANESTHETICS: See EMR  __________________________________________      Relevant Problems   No relevant active problems       Physical Exam    Airway   Mallampati: II  TM distance: >3 FB  Neck ROM: full       Cardiovascular - normal exam  Rhythm: regular  Rate: normal    (-) murmur     Dental - normal exam           Pulmonary - normal examBreath sounds clear to auscultation     Abdominal    Neurological - normal exam                   Anesthesia Plan    ASA 2       Plan - spinal   Neuraxial block will be primary anesthetic                  Pertinent diagnostic labs and testing reviewed    Informed Consent:    Anesthetic plan and risks discussed with patient.               [1] History reviewed. No pertinent past medical history.  [2]   Social History  Tobacco Use    Smoking status: Never     Smokeless tobacco: Never   Vaping Use    Vaping status: Never Used   Substance Use Topics    Alcohol use: Never    Drug use: Never   [3]   Past Surgical History:  Procedure Laterality Date    PRIMARY C SECTION Bilateral 2023    Procedure:  SECTION, PRIMARY;  Surgeon: Kevin Christian M.D.;  Location: [facility];  Service: Obstetrics   [4] No Known Allergies  [5]   No current facility-administered medications on file prior to encounter.     Current Outpatient Medications on File Prior to Encounter   Medication Sig Dispense Refill    omeprazole (PRILOSEC) 40 MG delayed-release capsule       acetaminophen (TYLENOL) 500 MG Tab Take 2 Tablets by mouth every 6 hours. (Patient not taking: Reported on 2025) 60 Tablet 2    docusate sodium 100 MG Cap Take 1 capsule by mouth 2 times a day as needed for Constipation. (Patient not taking: Reported on 2025) 60 Capsule 2    ibuprofen (MOTRIN) 800 MG Tab Take 1 Tablet by mouth every 8 hours. (Patient not taking: Reported on 2025) 60 Tablet 2    ferrous sulfate 325 (65 Fe) MG tablet Take 1 Tablet by mouth 2 times a day with meals. (Patient not taking: Reported on 2025) 60 Tablet 2    nitrofurantoin (MACROBID) 100 MG Cap Take 1 Capsule by mouth 2 times a day. (Patient not taking: Reported on 2025) 14 Capsule 0    Prenatal MV-Min-Fe Fum-FA-DHA (PRENATAL 1 PO) Take 1 Tablet by mouth every day.

## 2025-07-25 NOTE — PROGRESS NOTES
, EDC , [place] 20w5d. Patient presents to L&D for scheduled cervical cerclage. Patient denies LOF, VB, or UCs and reports + fetal movement. Patient escorted to triage room, oriented to room and unit, and external monitors applied. POC discussed with patient and s/o and encouraged to state needs or questions at any time.    1330 Dr. Cee at bedside, POC discussed with patient.    1624 Patient transferred to OR via bed with side rails up, see OR charting for details.    1900 Report give to Mini ROJAS, care relinquished.

## 2025-07-26 NOTE — CARE PLAN
The patient is Stable - Low risk of patient condition declining or worsening    Problem: Knowledge Deficit - L&D  Goal: Patient and family/caregivers will demonstrate understanding of plan of care, disease process/condition, diagnostic tests and medications  7/25/2025 2046 by Mini Ye R.N.  Outcome: Progressing  Note: Patient updated on POC all questions and concerns answered at this time. Call light within reach   7/25/2025 2045 by Mini Ye R.N.  Outcome: Progressing     Problem: Risk for Injury  Goal: Patient and fetus will be free of preventable injury/complications  Outcome: Progressing  Note: Remains free of falls

## 2025-07-26 NOTE — PROGRESS NOTES
1900: Report received from Abbey ROJAS     1945: POC discussed with patient and s/o and encouraged to state needs or questions at any time.     2100: updated Dr. Cee on pt up to bathroom and able to void, verbal order received for D/C.     2155: D/C papers reviewed with pt by this RN. Patient verbalizes understanding all questions and concerns answered at this time. Pat off the unit ambulating and stabl.

## 2025-07-26 NOTE — ANESTHESIA POSTPROCEDURE EVALUATION
Patient: Beka Du    Procedure Summary       Date: 07/25/25 Room / Location: [facility] OR 03 / [facility]    Anesthesia Start: 1627 Anesthesia Stop: 1705    Procedure: CERCLAGE, CERVIX (Vagina ) Diagnosis: (same)    Surgeons: Frederick Cee M.D. Responsible Provider: Arturo Novoa D.O.    Anesthesia Type: spinal ASA Status: 2            Final Anesthesia Type: spinal  Last vitals  BP   Blood Pressure: 101/55    Temp   36.4 °C (97.6 °F)    Pulse   75   Resp   16    SpO2   98 %      Anesthesia Post Evaluation    Patient location during evaluation: PACU  Patient participation: complete - patient participated  Level of consciousness: awake and alert    Airway patency: patent  Anesthetic complications: no  Cardiovascular status: hemodynamically stable  Respiratory status: acceptable  Hydration status: euvolemic    PONV: none    patient able to participate, but full recovery from regional anesthesia has not occurred and is not expected within the stipulated timeframe for the completion of the evaluation      There were no known notable events for this encounter.     Nurse Pain Score: 0 (NPRS)

## 2025-07-26 NOTE — ANESTHESIA TIME REPORT
Anesthesia Start and Stop Event Times       Date Time Event    7/25/2025 1609 Ready for Procedure     1627 Anesthesia Start     1705 Anesthesia Stop          Responsible Staff  07/25/25      Name Role Begin End    Arturo Novoa D.O. Anesth 1627 1705          Overtime Reason:  no overtime (within assigned shift)    Comments:

## 2025-07-26 NOTE — OP REPORT
Preoperative diagnosis/indications for procedure:   1. IUP at 20w5d weeks gestational age  2. Poor obstetric history   3. Cervical Incompetence    Postoperative diagnosis  Same as above    Surgeon: Coleman    Anesthesia: Spinal    Urine Output: Minimal    EBL: Minimal    Specimens: None    Complications: None    Procedure: Modified Julio Cerclage    Description of the procedure:  After proper identification, the patient was brought to the operative room. spinal anesthesia was administered without complication. The patient was placed in the dorsal lithotomy position with stirrups. The patient was prepped and draped in the usual fashion. Time-out was performed and the patient was identified and the procedure to be done was reviewed with the OR team. A Weighted speculum was placed in the posterior fornix. The vagina was prepped with Betadine. The cervix was examined. The bladder was drained with a catheter and removed. The superior lip of the cervix was grasped with ring forceps. The vesical-cervical peritoneum was identified. Using a 5 mm Mersilene tape and the Julio technique the cerclage was placed with the first entry at 12 o'clock position. This was continued counterclockwise in a purse string fashion finishing at 12 o'clock. The suture was tied down and the tails trimmed at 12 o'clock. The tails were also tagged with Prolene to aid removal later. The cervix was examined and noted to be closed. The vagina was irrigated again and cleared of all clot and debris. All instruments were removed from the vagina. The procedure was terminated at that point. All surgical counts were reported correct. She was taken to the recovery room in stable condition. Follow up has already been scheduled in our office in 2 weeks.

## 2025-08-04 ENCOUNTER — TELEPHONE (OUTPATIENT)
Dept: MATERNAL FETAL MEDICINE | Facility: MEDICAL CENTER | Age: 25
End: 2025-08-04
Payer: OTHER GOVERNMENT

## 2025-08-07 ENCOUNTER — ANCILLARY PROCEDURE (OUTPATIENT)
Dept: MATERNAL FETAL MEDICINE | Facility: MEDICAL CENTER | Age: 25
End: 2025-08-07
Payer: OTHER GOVERNMENT

## 2025-08-07 VITALS
BODY MASS INDEX: 33.7 KG/M2 | WEIGHT: 202.5 LBS | DIASTOLIC BLOOD PRESSURE: 69 MMHG | HEART RATE: 100 BPM | SYSTOLIC BLOOD PRESSURE: 115 MMHG

## 2025-08-07 DIAGNOSIS — O34.31 CERVICAL CERCLAGE SUTURE PRESENT IN FIRST TRIMESTER: ICD-10-CM

## 2025-08-07 PROCEDURE — 76817 TRANSVAGINAL US OBSTETRIC: CPT | Performed by: OBSTETRICS & GYNECOLOGY

## 2025-08-07 ASSESSMENT — FIBROSIS 4 INDEX: FIB4 SCORE: 0.29

## 2025-08-21 ENCOUNTER — APPOINTMENT (OUTPATIENT)
Dept: MATERNAL FETAL MEDICINE | Facility: MEDICAL CENTER | Age: 25
End: 2025-08-21
Payer: OTHER GOVERNMENT

## 2025-08-22 ENCOUNTER — ANCILLARY PROCEDURE (OUTPATIENT)
Dept: MATERNAL FETAL MEDICINE | Facility: MEDICAL CENTER | Age: 25
End: 2025-08-22
Attending: OBSTETRICS & GYNECOLOGY
Payer: OTHER GOVERNMENT

## 2025-08-22 VITALS
WEIGHT: 205.8 LBS | SYSTOLIC BLOOD PRESSURE: 117 MMHG | HEART RATE: 82 BPM | BODY MASS INDEX: 34.25 KG/M2 | DIASTOLIC BLOOD PRESSURE: 66 MMHG

## 2025-08-22 DIAGNOSIS — O09.299 HISTORY OF PRIOR PREGNANCY WITH SHORT CERVIX, CURRENTLY PREGNANT: ICD-10-CM

## 2025-08-22 ASSESSMENT — FIBROSIS 4 INDEX: FIB4 SCORE: 0.29

## (undated) DEVICE — TUBING CLEARLINK DUO-VENT - C-FLO (48EA/CA)

## (undated) DEVICE — CHLORAPREP 26 ML APPLICATOR - ORANGE TINT(25/CA)

## (undated) DEVICE — TRAY SPINAL ANESTHESIA NON-SAFETY (10/CA)

## (undated) DEVICE — SOLUTION 10%PVP-IODINE 8OZ - (24/CA)

## (undated) DEVICE — SUTURE 2-0 CHROMIC GUT CT-1 27 (36PK/BX)"

## (undated) DEVICE — SET EXTENSION WITH 2 PORTS (48EA/CA) ***PART #2C8610 IS A SUBSTITUTE*****

## (undated) DEVICE — STAPLER SKIN DISP - (6/BX 10BX/CA) VISISTAT

## (undated) DEVICE — HEMOSTAT ABSORBABLE POWDER SURGICEL 3G (5EA/BX)

## (undated) DEVICE — HEAD HOLDER JUNIOR/ADULT

## (undated) DEVICE — SUTURE 1 CHROMIC CTX ETHICON - (36PK/BX)

## (undated) DEVICE — KIT  I.V. START (100EA/CA)

## (undated) DEVICE — SOLUTION PLASMA-LYTE PH 7.4 INJ 1000ML (14EA/CA)

## (undated) DEVICE — TAPE CLOTH MEDIPORE 6 INCH - (12RL/CA)

## (undated) DEVICE — CATHETER IV NON-SAFETY 18 GA X 1 1/4 (50/BX 4BX/CA)

## (undated) DEVICE — Device

## (undated) DEVICE — DRESSING INTERCEED ABSORBABLE ADHESION BARRIER TC7 (10EA/CA)

## (undated) DEVICE — SUCTION INSTRUMENT YANKAUER OPEN TIP W/O VENT (50EA/CA)

## (undated) DEVICE — BAG SPONGE COUNT 10.25 X 32 - BLUE (250/CA)

## (undated) DEVICE — PACK VAGINAL FOR L&D (4EA/CA)

## (undated) DEVICE — SUTURE 3-0 VICRYL PLUS CT-1 - 36 INCH (36/BX)

## (undated) DEVICE — GLOVE BIOGEL SZ 8 SURGICAL PF LTX - (50PR/BX 4BX/CA)

## (undated) DEVICE — SODIUM CHL IRRIGATION 0.9% 1000ML (12EA/CA)

## (undated) DEVICE — CANNULA O2 COMFORT SOFT EAR ADULT 7 FT TUBING (50/CA)

## (undated) DEVICE — WATER IRRIGATION STERILE 1000ML (12EA/CA)

## (undated) DEVICE — PENCIL ELECTSURG 10FT HLSTR - WITH BLADE (50EA/CA)

## (undated) DEVICE — SLEEVE, SEQUENTIAL CALF REG

## (undated) DEVICE — PACK ROOM TURNOVER L&D (12/CA)

## (undated) DEVICE — CANISTER SUCTION 1200 CC MECHANICAL FILTER AUTO SHUT OFF MEDI-VAC STERILE LF DISP - (40EA/CA)

## (undated) DEVICE — LACTATED RINGERS INJ 1000 ML - (14EA/CA 60CA/PF)

## (undated) DEVICE — TRAY SPINAL ANESTHESIA NON-SAFETY (20/CA)

## (undated) DEVICE — NEEDLE SAFETY 25 GA X 5/8 IN LL HYPO (100/BX 12BX/CA) WAS #6351

## (undated) DEVICE — CANISTER SUCTION 3000ML MECHANICAL FILTER AUTO SHUTOFF MEDI-VAC NONSTERILE LF DISP  (40EA/CA)

## (undated) DEVICE — BLANKET UNDERBODY FULL ACCES - (5/CA)

## (undated) DEVICE — PACK C-SECTION (2EA/CA)

## (undated) DEVICE — SUTURE 0 VICRYL PLUS CT-1 - 36 INCH (36/BX)

## (undated) DEVICE — SUTURE 0 VICRYL PLUS CT 36 (36PK/BX)"

## (undated) DEVICE — RETRACTOR O C SECTION X-LRY - (5/BX)

## (undated) DEVICE — TRAY SRGPRP PVP IOD WT PRP - (20/CA)

## (undated) DEVICE — GLOVE BIOGEL SZ 7.5 SURGICAL PF LTX - (50PR/BX 4BX/CA)

## (undated) DEVICE — JELLY SURGILUBE STERILE FOIL 5 GM (150EA/BX)

## (undated) DEVICE — SUTURE 3-0 VICRYL PLUS SH - 27 INCH (36/BX)